# Patient Record
Sex: FEMALE | Race: WHITE | NOT HISPANIC OR LATINO | Employment: FULL TIME | URBAN - METROPOLITAN AREA
[De-identification: names, ages, dates, MRNs, and addresses within clinical notes are randomized per-mention and may not be internally consistent; named-entity substitution may affect disease eponyms.]

---

## 2017-03-27 ENCOUNTER — OFFICE VISIT (OUTPATIENT)
Dept: MEDICAL GROUP | Age: 52
End: 2017-03-27
Payer: COMMERCIAL

## 2017-03-27 VITALS
DIASTOLIC BLOOD PRESSURE: 80 MMHG | WEIGHT: 238.4 LBS | SYSTOLIC BLOOD PRESSURE: 122 MMHG | OXYGEN SATURATION: 96 % | HEIGHT: 68 IN | TEMPERATURE: 97.9 F | BODY MASS INDEX: 36.13 KG/M2 | HEART RATE: 80 BPM

## 2017-03-27 DIAGNOSIS — M72.2 PLANTAR FASCIITIS, BILATERAL: ICD-10-CM

## 2017-03-27 DIAGNOSIS — E78.2 MIXED HYPERLIPIDEMIA: ICD-10-CM

## 2017-03-27 PROCEDURE — 99214 OFFICE O/P EST MOD 30 MIN: CPT | Performed by: FAMILY MEDICINE

## 2017-03-27 RX ORDER — LURASIDONE HYDROCHLORIDE 20 MG/1
20 TABLET, FILM COATED ORAL
COMMUNITY
End: 2017-09-29

## 2017-03-27 NOTE — PATIENT INSTRUCTIONS
Do not go barefoot  Take Advil 200 mg - take 3 pills 3 times per day with food for 3-5 days.  Ice 5 minutes every evening.  Stretch your foot.

## 2017-03-27 NOTE — MR AVS SNAPSHOT
"        Chioma Mock   3/27/2017 8:50 AM   Office Visit   MRN: [id number]    Department:  [address] [place] [address]   Dept Phone:  [phone]    Description:  Female : 1965   Provider:  Verena Kebede M.D.           Reason for Visit     Foot Pain both heels x 4 months      Allergies as of 3/27/2017     Allergen Noted Reactions    Nkda [No Known Drug Allergy] 10/19/2010       Norco [Hydrocodone-Acetaminophen] 2014       Vomiting       You were diagnosed with     Plantar fasciitis, bilateral   [934089]       Mixed hyperlipidemia   [272.2.ICD-9-CM]         Vital Signs     Blood Pressure Pulse Temperature Height Weight Body Mass Index    122/80 mmHg 80 36.6 °C (97.9 °F) 1.727 m (5' 7.99\") 108.138 kg (238 lb 6.4 oz) 36.26 kg/m2    Oxygen Saturation Breastfeeding? Smoking Status             96% No Never Smoker          Basic Information     Date Of Birth Sex Race Ethnicity Preferred Language    1965 Female White Non- English      Your appointments     2017  3:00 PM   Annual Exam with Mey Hubbard M.D.   [facility]    [address]  [place] [address] [phone]            2017  9:50 AM   ANNUAL EXAM PREVENTATIVE with Verena Kebede M.D.   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   2009 - Present    Insomnia G47.00   2009 - Present    Obesity E66.9   2010 - Present    Vitamin D insufficiency E55.9   2011 - Present    Neurodermatitis L28.0   2011 - Present    Perimenopausal N95.1   2013 - Present    History of nephrolithiasis " Z87.442   9/29/2014 - Present      Health Maintenance        Date Due Completion Dates    IMM INFLUENZA (1) 9/1/2016 9/29/2014, 11/11/2013    MAMMOGRAM 5/24/2017 5/24/2016, 2/20/2013, 8/24/2009, 8/24/2009, 8/13/2007, 8/13/2007, 7/15/2005    PAP SMEAR 3/28/2019 3/28/2016, 3/28/2016, 1/28/2013    IMM DTaP/Tdap/Td Vaccine (2 - Td) 6/14/2026 6/14/2016, 5/13/2016            Current Immunizations     Influenza TIV (IM) 11/11/2013    Influenza Vaccine Quad Inj (Pf) 9/29/2014    Tdap Vaccine 6/14/2016, 5/13/2016    Tetanus Vaccine 7/28/2006      Below and/or attached are the medications your provider expects you to take. Review all of your home medications and newly ordered medications with your provider and/or pharmacist. Follow medication instructions as directed by your provider and/or pharmacist. Please keep your medication list with you and share with your provider. Update the information when medications are discontinued, doses are changed, or new medications (including over-the-counter products) are added; and carry medication information at all times in the event of emergency situations     Allergies:  NKDA - (reactions not documented)     NORCO - (reactions not documented)               Medications  Valid as of: March 27, 2017 -  9:22 AM    Generic Name Brand Name Tablet Size Instructions for use    BuPROPion HCl   Take 200 mg by mouth every day.        ClonazePAM (Tab) KLONOPIN 0.5 MG Take 0.5 mg by mouth every bedtime.        Levonorgestrel (IUD) MIRENA 20 MCG/24HR 1 Each by Intrauterine route Once.        Lurasidone HCl (Tab) LATUDA 20 MG Take 20 mg by mouth with dinner.        TraZODone HCl (Tab) DESYREL 100 MG Take 100 mg by mouth every evening.        .                 Medicines prescribed today were sent to:     [facility] #[id number] - [place], [place] - [address] [place] [address] AT [address] [address]    Phone: [phone] Fax: [phone]    Open 24 Hours?: No      Medication refill  instructions:       If your prescription bottle indicates you have medication refills left, it is not necessary to call your provider’s office. Please contact your pharmacy and they will refill your medication.    If your prescription bottle indicates you do not have any refills left, you may request refills at any time through one of the following ways: The online [employer] system (except Urgent Care), by calling your provider’s office, or by asking your pharmacy to contact your provider’s office with a refill request. Medication refills are processed only during regular business hours and may not be available until the next business day. Your provider may request additional information or to have a follow-up visit with you prior to refilling your medication.   *Please Note: Medication refills are assigned a new Rx number when refilled electronically. Your pharmacy may indicate that no refills were authorized even though a new prescription for the same medication is available at the pharmacy. Please request the medicine by name with the pharmacy before contacting your provider for a refill.        Your To Do List     Future Labs/Procedures Complete By Expires    COMP METABOLIC PANEL  As directed 3/28/2018    LIPID PROFILE  As directed 3/27/2018      Instructions    Do not go barefoot  Take Advil 200 mg - take 3 pills 3 times per day with food for 3-5 days.  Ice 5 minutes every evening.  Stretch your foot.         Other Notes About Your Plan                  [employer] Access Code: Activation code not generated  Current [employer] Status: Active

## 2017-03-27 NOTE — PROGRESS NOTES
Subjective:     Chief Complaint   Patient presents with   • Foot Pain     both heels x 4 months     Chioma Mock is a 51 y.o. female here today for issues listed below      1.  about 4 months of pain at the bottom of the heels on both feet. First step after getting out of bed in the morning and after sitting for a while is very painful. Improves after several steps however at the end of the day she'll have aching if she's been walking a lot. Has not tried ice or anti-inflammatories. Symptoms are worse when barefoot in better in shoes. She has been doing some walking. No known injury. No prior history of the same.    2. Mixed hyperlipidemia chronic condition. Last labs November 2016 with elevated total and LDL cholesterol. Since then she has lost some weight. She is not specifically tracking steps or calories     She continues to see psychiatry for depression. Has had some medication adjustments which are working well for her  Allergies: Nkda and Norco  Current medicines (including changes today)  Current Outpatient Prescriptions   Medication Sig Dispense Refill   • lurasidone (LATUDA) 20 MG Tab Take 20 mg by mouth with dinner.     • levonorgestrel (MIRENA, 52 MG,) 20 MCG/24HR IUD 1 Each by Intrauterine route Once.     • BuPROPion HCl (WELLBUTRIN XL PO) Take 200 mg by mouth every day.     • trazodone (DESYREL) 100 MG TABS Take 100 mg by mouth every evening.     • clonazepam (KLONOPIN) 0.5 MG TABS Take 0.5 mg by mouth every bedtime.       No current facility-administered medications for this visit.       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  Health Maintenance: Pap and mammogram are current  ROS  Constitutional: Negative for fever, chills/sweats   Respiratory: Negative for shortness of breath, MCCLOUD  Cardiovascular: Negative for chest pain or pressure  GI/: Negative for diarrhea/constipation / urinary difficulty  All other systems reviewed and are negative except as per HPI.        Objective:     Blood pressure 122/80, pulse 80, temperature 36.6 °C (97.9 °F), height 1.727 m (5' 7.99\"), weight 108.138 kg (238 lb 6.4 oz), SpO2 96 %, not currently breastfeeding. Body mass index is 36.26 kg/(m^2).  Physical Exam:  Wt Readings from Last 4 Encounters:   03/27/17 108.138 kg (238 lb 6.4 oz)   07/28/16 109.317 kg (241 lb)   07/08/16 109.77 kg (242 lb)   05/13/16 113.309 kg (249 lb 12.8 oz)     Alert, oriented in no acute distress.  Eye contact is good, speech goal directed, affect calm  Ext: no edema, no tenderness to palpation over shins. Tenderness to palpation over plantar surfaces of bilateral heels. Very high arch. Good Achilles flexibility and gastroc flexibility. Ambulates without limp      Assessment and Plan:       Treatment Changes: NSAIDs dosing discussed.  Chioma was seen today for foot pain.    Diagnoses and all orders for this visit:    Plantar fasciitis, bilateral  Comments:  Discussed rest, ice, anti-inflammatories, supportive footwear. If not significantly improved in the next month recommend podiatry    Mixed hyperlipidemia  Comments:  Elevated on prior visit. Do labs prior to next office visit.  Orders:  -     LIPID PROFILE; Future  -     COMP METABOLIC PANEL; Future    Obesity, Class II, BMI 35-39.9, with comorbidity (CMS-HCC)  Comments:  Discussed diet, exercise. Encouraged patient to continue " current weight loss.  Orders:  -     Patient identified as having weight management issue.  Appropriate orders and counseling given.        Followup: Return for appt July 2017 already scheduled. sooner should new symptoms or problems arise.

## 2017-06-14 ENCOUNTER — OFFICE VISIT (OUTPATIENT)
Dept: MEDICAL GROUP | Facility: PHYSICIAN GROUP | Age: 52
End: 2017-06-14
Payer: COMMERCIAL

## 2017-06-14 VITALS
TEMPERATURE: 98.1 F | OXYGEN SATURATION: 93 % | HEART RATE: 75 BPM | RESPIRATION RATE: 16 BRPM | SYSTOLIC BLOOD PRESSURE: 102 MMHG | BODY MASS INDEX: 37.44 KG/M2 | WEIGHT: 247 LBS | DIASTOLIC BLOOD PRESSURE: 70 MMHG | HEIGHT: 68 IN

## 2017-06-14 DIAGNOSIS — M72.2 PLANTAR FASCIITIS, BILATERAL: ICD-10-CM

## 2017-06-14 PROBLEM — M79.672 BILATERAL FOOT PAIN: Status: ACTIVE | Noted: 2017-06-14

## 2017-06-14 PROBLEM — M79.671 BILATERAL FOOT PAIN: Status: ACTIVE | Noted: 2017-06-14

## 2017-06-14 PROCEDURE — 99213 OFFICE O/P EST LOW 20 MIN: CPT | Performed by: NURSE PRACTITIONER

## 2017-06-14 RX ORDER — BUPROPION HYDROCHLORIDE 200 MG/1
200 TABLET, EXTENDED RELEASE ORAL 2 TIMES DAILY
COMMUNITY
End: 2018-03-28

## 2017-06-14 RX ORDER — NAPROXEN 500 MG/1
500 TABLET ORAL 2 TIMES DAILY WITH MEALS
Qty: 30 TAB | Refills: 0 | Status: SHIPPED | OUTPATIENT
Start: 2017-06-14 | End: 2018-03-28

## 2017-06-14 RX ORDER — TRAZODONE HYDROCHLORIDE 150 MG/1
150 TABLET ORAL NIGHTLY
COMMUNITY
End: 2020-07-01

## 2017-06-14 NOTE — MR AVS SNAPSHOT
"        Chioma DAX Emili   2017 11:00 AM   Office Visit   MRN: [id number]    Department:  [facility]   Dept Phone:  [phone]    Description:  Female : 1965   Provider:  ASHA Pardo           Reason for Visit     Foot Pain bilateral foot pain      Allergies as of 2017     Allergen Noted Reactions    Nkda [No Known Drug Allergy] 10/19/2010       Norco [Hydrocodone-Acetaminophen] 2014       Vomiting       You were diagnosed with     Bilateral foot pain   [382315]         Vital Signs     Blood Pressure Pulse Temperature Respirations Height Weight    102/70 mmHg 75 36.7 °C (98.1 °F) 16 1.727 m (5' 7.99\") 112.038 kg (247 lb)    Body Mass Index Oxygen Saturation Smoking Status             37.56 kg/m2 93% Never Smoker          Basic Information     Date Of Birth Sex Race Ethnicity Preferred Language    1965 Female White Non- English      Your appointments     2017  3:00 PM   Annual Exam with Lida Smith M.D.   [facility] ([address], [address] [id number] [phone]            Aug 30, 2017  4:20 PM   NEW TO YOU with Kia Sue D.O.   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   2009 - Present    Insomnia G47.00   2009 - Present    Obesity E66.9   2010 - Present    Vitamin D insufficiency E55.9   2011 - Present    Neurodermatitis L28.0   2011 - Present    Perimenopausal N95.1   2013 - Present    History of nephrolithiasis Z87.442   2014 - Present    Bilateral foot pain M79.671, M79.672   2017 - Present      Health " Maintenance        Date Due Completion Dates    MAMMOGRAM 5/24/2017 5/24/2016, 2/20/2013, 8/24/2009, 8/24/2009, 8/13/2007, 8/13/2007, 7/15/2005    PAP SMEAR 3/28/2019 3/28/2016, 3/28/2016, 1/28/2013    IMM DTaP/Tdap/Td Vaccine (2 - Td) 6/14/2026 6/14/2016, 5/13/2016            Current Immunizations     Influenza TIV (IM) 11/11/2013    Influenza Vaccine Quad Inj (Pf) 9/29/2014    Tdap Vaccine 6/14/2016, 5/13/2016    Tetanus Vaccine 7/28/2006      Below and/or attached are the medications your provider expects you to take. Review all of your home medications and newly ordered medications with your provider and/or pharmacist. Follow medication instructions as directed by your provider and/or pharmacist. Please keep your medication list with you and share with your provider. Update the information when medications are discontinued, doses are changed, or new medications (including over-the-counter products) are added; and carry medication information at all times in the event of emergency situations     Allergies:  NKDA - (reactions not documented)     NORCO - (reactions not documented)               Medications  Valid as of: June 14, 2017 - 11:28 AM    Generic Name Brand Name Tablet Size Instructions for use    BuPROPion HCl   Take 200 mg by mouth every day.        BuPROPion HCl (TABLET SR 12 HR) WELLBUTRIN  MG Take 200 mg by mouth 2 times a day.        ClonazePAM (Tab) KLONOPIN 0.5 MG Take 0.5 mg by mouth every bedtime.        LamoTRIgine   Take  by mouth.        Levonorgestrel (IUD) MIRENA 20 MCG/24HR 1 Each by Intrauterine route Once.        Lurasidone HCl (Tab) LATUDA 20 MG Take 20 mg by mouth with dinner.        Naproxen (Tab) NAPROSYN 500 MG Take 1 Tab by mouth 2 times a day, with meals.        TraZODone HCl (Tab) DESYREL 100 MG Take 100 mg by mouth every evening.        TraZODone HCl (Tab) DESYREL 150 MG Take 150 mg by mouth every evening.        .                 Medicines prescribed today were sent to:        [facility] #[id number] - [place], [place] - [address]    [address] [facility] [place] [address]    Phone: [phone] Fax: [phone]    Open 24 Hours?: No      Medication refill instructions:       If your prescription bottle indicates you have medication refills left, it is not necessary to call your provider’s office. Please contact your pharmacy and they will refill your medication.    If your prescription bottle indicates you do not have any refills left, you may request refills at any time through one of the following ways: The online "[employer] system (except Urgent Care), by calling your provider’s office, or by asking your pharmacy to contact your provider’s office with a refill request. Medication refills are processed only during regular business hours and may not be available until the next business day. Your provider may request additional information or to have a follow-up visit with you prior to refilling your medication.   *Please Note: Medication refills are assigned a new Rx number when refilled electronically. Your pharmacy may indicate that no refills were authorized even though a new prescription for the same medication is available at the pharmacy. Please request the medicine by name with the pharmacy before contacting your provider for a refill.        Referral     A referral request has been sent to our patient care coordination department. Please allow 3-5 business days for us to process this request and contact you either by phone or mail. If you do not hear from us by the 5th business day, please call us at [phone].        Other Notes About Your Plan                  [employer] Access Code: Activation code not generated  Current "[employer] Status: Active

## 2017-06-14 NOTE — ASSESSMENT & PLAN NOTE
Established problem, ongoing since Fall 2016. Diagnosed as plantar fasciitis bilaterally. It is getting worse. States she was recommended to stretch feet and roll on ice. Not prescribed any anti-inflammatories. She does have Dr. Singh's insoles currently. Ibuprofen did not help.

## 2017-06-16 NOTE — PROGRESS NOTES
Subjective:     Chief Complaint   Patient presents with   • Foot Pain     bilateral foot pain       HPI  Chioma Mock is a 51 y.o. female here today for complaints of foot pain.    Bilateral foot pain  Established problem, ongoing since Fall 2016. Diagnosed as plantar fasciitis bilaterally. It is getting worse. States she was recommended to stretch feet and roll on ice. Not prescribed any anti-inflammatories. She does have Dr. Singh's insoles currently. Ibuprofen did not help.          The encounter diagnosis was Plantar fasciitis, bilateral.    Allergies: Nkda and Norco  Current medicines (including changes today)  Current Outpatient Prescriptions   Medication Sig Dispense Refill   • LamoTRIgine (LAMICTAL PO) Take  by mouth.     • buPROPion (WELLBUTRIN SR) 200 MG SR tablet Take 200 mg by mouth 2 times a day.     • trazodone (DESYREL) 150 MG Tab Take 150 mg by mouth every evening.     • naproxen (NAPROSYN) 500 MG Tab Take 1 Tab by mouth 2 times a day, with meals. 30 Tab 0   • levonorgestrel (MIRENA, 52 MG,) 20 MCG/24HR IUD 1 Each by Intrauterine route Once.     • clonazepam (KLONOPIN) 0.5 MG TABS Take 0.5 mg by mouth every bedtime.     • lurasidone (LATUDA) 20 MG Tab Take 20 mg by mouth with dinner.     • BuPROPion HCl (WELLBUTRIN XL PO) Take 200 mg by mouth every day.     • trazodone (DESYREL) 100 MG TABS Take 100 mg by mouth every evening.       No current facility-administered medications for this visit.       She  has a past medical history of Depressive disorder, not elsewhere classified (8/22/2009); Preventative health care (8/22/2009); Benign Neoplasm of Muscle left leg. (8/22/2009); Adjustment reaction with anxiety and depression (8/22/2009); Insomnia (8/22/2009); Juvenile myoclonic epilepsy (CMS-HCC); History of epilepsy (10/19/2010); Sleep related leg cramps (11/30/2010); Obesity (11/30/2010); Major depressive disorder, recurrent episode, moderate (CMS-HCC) (6/23/2011); Dermatitis (9/21/2011);  "Neurodermatitis (9/28/2011); Major depressive disorder, recurrent severe without psychotic features (CMS-HCC) (8/22/2009); and History of nephrolithiasis (9/29/2014).      ROS  As stated in HPI      Objective:     Blood pressure 102/70, pulse 75, temperature 36.7 °C (98.1 °F), resp. rate 16, height 1.727 m (5' 7.99\"), weight 112.038 kg (247 lb), SpO2 93 %. Body mass index is 37.56 kg/(m^2).  Physical Exam:  General: Alert, oriented, in no acute distress.  Eye contact is good, speech goal directed, affect calm  Gross hearing intact.  Ext: no edema, no tenderness to palpation over shins or ankle joints. Tenderness to palpation over plantar surfaces of bilateral heels. Very high arch. Good Achilles flexibility flexibility. Ambulates without limp  Gait steady.     Assessment and Plan:   Assessment/Plan:  1. Plantar fascitis, bilateral   established problem not controlled. Start naproxen twice daily with food for 2 weeks. Continue stretching, night splints, ice, and recommended referral to podiatry for further management as this appears to be an ongoing problem.  - naproxen (NAPROSYN) 500 MG Tab; Take 1 Tab by mouth 2 times a day, with meals.  Dispense: 30 Tab; Refill: 0  - REFERRAL TO PODIATRY       Follow up:  Return if symptoms worsen or fail to improve.    Educated in proper administration of medication(s) ordered today including safety, possible SE, risks, benefits, rationale and alternatives to therapy.   Supportive care, differential diagnoses, and indications for immediate follow-up discussed with patient.    Pathogenesis of diagnosis discussed including typical length and natural progression.    Instructed to return to clinic or nearest emergency department for any change in condition, further concerns, or worsening of symptoms.  Patient states understanding of the plan of care and discharge instructions.      Please note that this dictation was created using voice recognition software. I have made every " reasonable attempt to correct obvious errors, but I expect that there are errors of grammar and possibly content that I did not discover before finalizing the note.    Followup: Return if symptoms worsen or fail to improve. sooner should new symptoms or problems arise.

## 2017-07-05 ENCOUNTER — GYNECOLOGY VISIT (OUTPATIENT)
Dept: OBGYN | Facility: CLINIC | Age: 52
End: 2017-07-05
Payer: COMMERCIAL

## 2017-07-05 VITALS
BODY MASS INDEX: 36.98 KG/M2 | HEIGHT: 68 IN | DIASTOLIC BLOOD PRESSURE: 82 MMHG | SYSTOLIC BLOOD PRESSURE: 124 MMHG | WEIGHT: 244 LBS

## 2017-07-05 DIAGNOSIS — Z01.419 WELL WOMAN EXAM: ICD-10-CM

## 2017-07-05 DIAGNOSIS — Z12.31 ENCOUNTER FOR SCREENING MAMMOGRAM FOR BREAST CANCER: ICD-10-CM

## 2017-07-05 PROCEDURE — 99396 PREV VISIT EST AGE 40-64: CPT | Performed by: OBSTETRICS & GYNECOLOGY

## 2017-07-05 NOTE — MR AVS SNAPSHOT
"        Chioma Mock   2017 3:00 PM   Gynecology Visit   MRN: [id number]    Department:  [facility]   Dept Phone:  [phone]    Description:  Female : 1965   Provider:  Lida Smith M.D.           Reason for Visit     Gynecologic Exam           Allergies as of 2017     Allergen Noted Reactions    Nkda [No Known Drug Allergy] 10/19/2010       Norco [Hydrocodone-Acetaminophen] 2014       Vomiting       You were diagnosed with     Well woman exam   [421105]       Encounter for screening mammogram for breast cancer   [[id number]]         Vital Signs     Blood Pressure Height Weight Body Mass Index Smoking Status       124/82 mmHg 1.727 m (5' 8\") 110.678 kg (244 lb) 37.11 kg/m2 Never Smoker        Basic Information     Date Of Birth Sex Race Ethnicity Preferred Language    1965 Female White Non- English      Your appointments     Aug 30, 2017  4:20 PM   NEW TO YOU with Kia Sue D.O.   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   2009 - Present    Insomnia G47.00   2009 - Present    Obesity E66.9   2010 - Present    Vitamin D insufficiency E55.9   2011 - Present    Neurodermatitis L28.0   2011 - Present    Perimenopausal N95.1   2013 - Present    History of nephrolithiasis Z87.442   2014 - Present    Bilateral foot pain M79.671, M79.672   2017 - Present      Health Maintenance        Date Due Completion Dates    MAMMOGRAM 2017, 2013, 2009, 2009, 2007, 2007, 7/15/2005    IMM INFLUENZA (1) 2017, 2013    PAP SMEAR 3/28/2019 " 3/28/2016, 3/28/2016, 1/28/2013    IMM DTaP/Tdap/Td Vaccine (2 - Td) 6/14/2026 6/14/2016, 5/13/2016            Current Immunizations     Influenza TIV (IM) 11/11/2013    Influenza Vaccine Quad Inj (Pf) 9/29/2014    Tdap Vaccine 6/14/2016, 5/13/2016    Tetanus Vaccine 7/28/2006      Below and/or attached are the medications your provider expects you to take. Review all of your home medications and newly ordered medications with your provider and/or pharmacist. Follow medication instructions as directed by your provider and/or pharmacist. Please keep your medication list with you and share with your provider. Update the information when medications are discontinued, doses are changed, or new medications (including over-the-counter products) are added; and carry medication information at all times in the event of emergency situations     Allergies:  NKDA - (reactions not documented)     NORCO - (reactions not documented)               Medications  Valid as of: July 05, 2017 -  3:28 PM    Generic Name Brand Name Tablet Size Instructions for use    BuPROPion HCl   Take 200 mg by mouth every day.        BuPROPion HCl (TABLET SR 12 HR) WELLBUTRIN  MG Take 200 mg by mouth 2 times a day.        ClonazePAM (Tab) KLONOPIN 0.5 MG Take 0.5 mg by mouth every bedtime.        LamoTRIgine   Take  by mouth.        Levonorgestrel (IUD) MIRENA 20 MCG/24HR 1 Each by Intrauterine route Once.        Lurasidone HCl (Tab) LATUDA 20 MG Take 20 mg by mouth with dinner.        Naproxen (Tab) NAPROSYN 500 MG Take 1 Tab by mouth 2 times a day, with meals.        TraZODone HCl (Tab) DESYREL 100 MG Take 100 mg by mouth every evening.        TraZODone HCl (Tab) DESYREL 150 MG Take 150 mg by mouth every evening.        .                 Medicines prescribed today were sent to:     [facility] #[id number] - [place], [address] AT [address] [address]    Phone: [phone] Fax: [phone]    Open 24 Hours?: No        Medication refill instructions:       If your prescription bottle indicates you have medication refills left, it is not necessary to call your provider’s office. Please contact your pharmacy and they will refill your medication.    If your prescription bottle indicates you do not have any refills left, you may request refills at any time through one of the following ways: The online [employer] system (except Urgent Care), by calling your provider’s office, or by asking your pharmacy to contact your provider’s office with a refill request. Medication refills are processed only during regular business hours and may not be available until the next business day. Your provider may request additional information or to have a follow-up visit with you prior to refilling your medication.   *Please Note: Medication refills are assigned a new Rx number when refilled electronically. Your pharmacy may indicate that no refills were authorized even though a new prescription for the same medication is available at the pharmacy. Please request the medicine by name with the pharmacy before contacting your provider for a refill.        Your To Do List     Future Labs/Procedures Complete By Expires    MA-SCREEN MAMMO W/CAD-BILAT  As directed 7/5/2018      Other Notes About Your Plan                  [employer] Access Code: Activation code not generated  Current [employer] Status: Active

## 2017-07-05 NOTE — PROGRESS NOTES
"Subjective:      Chioma Mock is a 51 y.o. female who presents with Gynecologic Exam        CC: annual exam    Gynecologic Exam    50 yo  using Mirena IUD for contraception presents for annual exam.  No complaints.  No menopausal symtpoms.  Not having periods with Mirena. No hx of cervical dysplasia or abnormal pap smears.     ROS.REVIEW OF SYSTEMS:    Pertinent positives and negatives mentioned in HPI.    All other systems reviewed and are negative.       Objective:     /82 mmHg  Ht 1.727 m (5' 8\")  Wt 110.678 kg (244 lb)  BMI 37.11 kg/m2     Physical Exam      GENERAL: Alert, in no apparent distress  PSYCHIATRIC: Appropriate affect, intact insight and judgement.  NECK:  Nontender, no masses.  No Thyromegaly or nodules. No lymphadenopathy.  RESPIRATORY: Normal respiratory effort.  Lungs clear to auscultation.   CARDIOVASCULAR: RRR, no murmur, gallop, or rub.  ABDOMEN: Soft, obese, nontender, nondistended.  No palpable masses.  No rebound or guarding.  No inguinal lymphadenopathy.  No hepatosplenomegaly.  No hernias.  BACK: No CVA tenderness  EXTREMITIES: No edema  SKIN: No rash    BREAST: No masses or tenderness.  No skin changes.  No nipple inversion or discharge. No axillary lymphadenopathy.      GENITOURINARY:  Normal external genitalia, no lesions.  Normal urethral meatus, no masses or tenderness.  Normal bladder without fullness or masses.  Vagina well estrogenized, no vaginal discharge or lesions.  Cervix without lesions or discharge, nontender.  IUD strings not visible at cervical os.   Uterus normal size, shape, and contour, nontender.  Adnexa nontender, no masses.  Normal anus and perineum.    Rectal Exam - not indicated.       Assessment/Plan:     1. Well woman exam  Reviewed monthly self breast exams and calcium intake.  Pap current.  Mirena IUD in place for 4 years - strings not visible, but position has been checked by Dr. Hubbard and in proper position (after strings not " visible).    2. Encounter for screening mammogram for breast cancer  - MA-SCREEN MAMMO W/CAD-BILAT; Future    F/U prn.

## 2017-07-19 ENCOUNTER — HOSPITAL ENCOUNTER (OUTPATIENT)
Dept: RADIOLOGY | Facility: MEDICAL CENTER | Age: 52
End: 2017-07-19
Attending: OBSTETRICS & GYNECOLOGY
Payer: COMMERCIAL

## 2017-07-19 DIAGNOSIS — Z12.31 ENCOUNTER FOR SCREENING MAMMOGRAM FOR BREAST CANCER: ICD-10-CM

## 2017-07-19 PROCEDURE — 77063 BREAST TOMOSYNTHESIS BI: CPT

## 2017-09-29 ENCOUNTER — OFFICE VISIT (OUTPATIENT)
Dept: MEDICAL GROUP | Facility: PHYSICIAN GROUP | Age: 52
End: 2017-09-29
Payer: COMMERCIAL

## 2017-09-29 VITALS
HEIGHT: 68 IN | BODY MASS INDEX: 37.89 KG/M2 | RESPIRATION RATE: 18 BRPM | HEART RATE: 77 BPM | TEMPERATURE: 98.7 F | SYSTOLIC BLOOD PRESSURE: 110 MMHG | DIASTOLIC BLOOD PRESSURE: 80 MMHG | WEIGHT: 250 LBS | OXYGEN SATURATION: 96 %

## 2017-09-29 DIAGNOSIS — E55.9 VITAMIN D INSUFFICIENCY: ICD-10-CM

## 2017-09-29 DIAGNOSIS — Z23 NEED FOR INFLUENZA VACCINATION: ICD-10-CM

## 2017-09-29 DIAGNOSIS — E66.9 OBESITY (BMI 30-39.9): ICD-10-CM

## 2017-09-29 DIAGNOSIS — L03.116 CELLULITIS OF LEFT LOWER EXTREMITY: ICD-10-CM

## 2017-09-29 PROCEDURE — 90686 IIV4 VACC NO PRSV 0.5 ML IM: CPT | Performed by: FAMILY MEDICINE

## 2017-09-29 PROCEDURE — 99214 OFFICE O/P EST MOD 30 MIN: CPT | Mod: 25 | Performed by: FAMILY MEDICINE

## 2017-09-29 PROCEDURE — 90471 IMMUNIZATION ADMIN: CPT | Performed by: FAMILY MEDICINE

## 2017-09-29 RX ORDER — TRIAMCINOLONE ACETONIDE 1 MG/G
CREAM TOPICAL
Qty: 1 TUBE | Refills: 1 | Status: SHIPPED | OUTPATIENT
Start: 2017-09-29 | End: 2018-03-28

## 2017-09-29 RX ORDER — CEPHALEXIN 500 MG/1
500 CAPSULE ORAL 2 TIMES DAILY
Qty: 14 CAP | Refills: 0 | Status: SHIPPED | OUTPATIENT
Start: 2017-09-29 | End: 2017-10-06

## 2017-09-29 RX ORDER — TRIAMCINOLONE ACETONIDE 1 MG/G
CREAM TOPICAL
Qty: 1 TUBE | Refills: 1 | Status: SHIPPED | OUTPATIENT
Start: 2017-09-29 | End: 2017-09-29 | Stop reason: SDUPTHER

## 2017-09-29 RX ORDER — CEPHALEXIN 500 MG/1
500 CAPSULE ORAL 2 TIMES DAILY
Qty: 14 CAP | Refills: 0 | Status: SHIPPED | OUTPATIENT
Start: 2017-09-29 | End: 2017-09-29 | Stop reason: SDUPTHER

## 2017-09-29 ASSESSMENT — PATIENT HEALTH QUESTIONNAIRE - PHQ9
SUM OF ALL RESPONSES TO PHQ QUESTIONS 1-9: 5
CLINICAL INTERPRETATION OF PHQ2 SCORE: 2
5. POOR APPETITE OR OVEREATING: 0 - NOT AT ALL

## 2017-09-29 NOTE — ASSESSMENT & PLAN NOTE
Ongoing issue. Patient reports that she has not been following a specific healthy eating plan on getting regular daily exercise. Unfortunately review of her current medications also reveal that her medications could be contributing to her weight gain also.

## 2017-09-29 NOTE — PROGRESS NOTES
Subjective:   Chioma Mock is a 51 y.o. female here today for Lab review, skin irritation, obesity, lab review    Vitamin D insufficiency  Ongoing issue. Patient reports she currently takes vitamin D 2000 international units once a day. Chart review shows less vitamin D level was at 31 which is below recommend a range of 40.    Obesity (BMI 30-39.9)  Ongoing issue. Patient reports that she has not been following a specific healthy eating plan on getting regular daily exercise. Unfortunately review of her current medications also reveal that her medications could be contributing to her weight gain also.    Cellulitis of left lower extremity  New problem. Patient reports that she has had redness and itching over the anterior portion of the lower left leg. She states the present for approximately 2 weeks. It is Progressively worse; as the itching is worse she has actually opened up the skin. She has multiple lesions that have opened up and drained/bled. She had been given a prescription for an anti-itch cream that she mix it with over-the-counter Neosporin and reports that this has not been beneficial in relieving her problem. She denies any specific pain, fever, chills.         Current medicines (including changes today)  Current Outpatient Prescriptions   Medication Sig Dispense Refill   • triamcinolone acetonide (KENALOG) 0.1 % Cream Apply to affected area twice a day for 2 weeks 1 Tube 1   • cephALEXin (KEFLEX) 500 MG Cap Take 1 Cap by mouth 2 times a day for 7 days. 14 Cap 0   • LamoTRIgine (LAMICTAL PO) Take  by mouth.     • buPROPion (WELLBUTRIN SR) 200 MG SR tablet Take 200 mg by mouth 2 times a day.     • trazodone (DESYREL) 150 MG Tab Take 150 mg by mouth every evening.     • levonorgestrel (MIRENA, 52 MG,) 20 MCG/24HR IUD 1 Each by Intrauterine route Once.     • BuPROPion HCl (WELLBUTRIN XL PO) Take 200 mg by mouth every day.     • clonazepam (KLONOPIN) 0.5 MG TABS Take 0.5 mg by mouth every bedtime.    "  • naproxen (NAPROSYN) 500 MG Tab Take 1 Tab by mouth 2 times a day, with meals. 30 Tab 0     No current facility-administered medications for this visit.      She  has a past medical history of Adjustment reaction with anxiety and depression (8/22/2009); Benign Neoplasm of Muscle left leg. (8/22/2009); Depressive disorder, not elsewhere classified (8/22/2009); Dermatitis (9/21/2011); History of epilepsy (10/19/2010); History of nephrolithiasis (9/29/2014); Insomnia (8/22/2009); Juvenile myoclonic epilepsy (CMS-HCC); Major depressive disorder, recurrent episode, moderate (CMS-HCC) (6/23/2011); Major depressive disorder, recurrent severe without psychotic features (CMS-HCC) (8/22/2009); Neurodermatitis (9/28/2011); Obesity (11/30/2010); Preventative health care (8/22/2009); and Sleep related leg cramps (11/30/2010).    ROS   No chest pain, no shortness of breath, no abdominal pain  +Skin irritation on left lower leg     Objective:     Pulse 77, temperature 37.1 °C (98.7 °F), resp. rate 18, height 1.727 m (5' 8\"), weight 113.4 kg (250 lb), SpO2 96 %. Body mass index is 38.01 kg/m².   Physical Exam:  Alert, oriented in no acute distress.  Eye contact is good, speech goal directed, affect calm  HEENT: conjunctiva non-injected, sclera non-icteric.  Pinna normal. Oral mucous membranes pink and moist with no lesions.  Neck No adenopathy or masses in the neck or supraclavicular regions.  Lungs: clear to auscultation bilaterally with good excursion.  CV: regular rate and rhythm.  Abdomen: soft, nontender, No CVAT; Obese  Ext: no edema, color normal, vascularity normal, temperature normal  Skin: Anterior shin of left lower leg-area of erythema extending approximately 15 cm in length and 6 cm in width. Warm to touch; open sores appreciated; no tenderness on palpation; no fluctuance noted      Assessment and Plan:   The following treatment plan was discussed     1. Obesity (BMI 30-39.9)  COMP METABOLIC PANEL    LIPID PROFILE "    VITAMIN D,25 HYDROXY    Uncontrolled. Encourage patient to use healthy eating and daily exercise to help with weight management. Weight loss may be difficult secondary to Psychiatric medications   2. Cellulitis of left lower extremity      Uncontrolled. Prescription for Keflex 500 mg one tab by mouth twice a day ×7 days; triamcinolone cream prescribed to help with itching   3. Vitamin D insufficiency      Uncontrolled; continues current supplementation; recheck labs and monitor for results   4. Need for influenza vaccination  Flu Quad Inj >3 Year Pre-Filled PF    Age appropriate immunization present; patient tolerated procedure well.       Followup: Return in about 6 months (around 3/29/2018) for medication review, Short.

## 2017-09-29 NOTE — ASSESSMENT & PLAN NOTE
New problem. Patient reports that she has had redness and itching over the anterior portion of the lower left leg. She states the present for approximately 2 weeks. It is Progressively worse; as the itching is worse she has actually opened up the skin. She has multiple lesions that have opened up and drained/bled. She had been given a prescription for an anti-itch cream that she mix it with over-the-counter Neosporin and reports that this has not been beneficial in relieving her problem. She denies any specific pain, fever, chills.

## 2017-09-29 NOTE — ASSESSMENT & PLAN NOTE
Ongoing issue. Patient reports she currently takes vitamin D 2000 international units once a day. Chart review shows less vitamin D level was at 31 which is below recommend a range of 40.

## 2018-02-10 ENCOUNTER — HOSPITAL ENCOUNTER (OUTPATIENT)
Dept: LAB | Facility: MEDICAL CENTER | Age: 53
End: 2018-02-10
Attending: FAMILY MEDICINE
Payer: COMMERCIAL

## 2018-02-10 DIAGNOSIS — E66.9 OBESITY (BMI 30-39.9): ICD-10-CM

## 2018-02-10 LAB
25(OH)D3 SERPL-MCNC: 27 NG/ML (ref 30–100)
ALBUMIN SERPL BCP-MCNC: 4.1 G/DL (ref 3.2–4.9)
ALBUMIN/GLOB SERPL: 1.3 G/DL
ALP SERPL-CCNC: 86 U/L (ref 30–99)
ALT SERPL-CCNC: 17 U/L (ref 2–50)
ANION GAP SERPL CALC-SCNC: 8 MMOL/L (ref 0–11.9)
AST SERPL-CCNC: 18 U/L (ref 12–45)
BILIRUB SERPL-MCNC: 0.5 MG/DL (ref 0.1–1.5)
BUN SERPL-MCNC: 16 MG/DL (ref 8–22)
CALCIUM SERPL-MCNC: 9.4 MG/DL (ref 8.5–10.5)
CHLORIDE SERPL-SCNC: 103 MMOL/L (ref 96–112)
CHOLEST SERPL-MCNC: 230 MG/DL (ref 100–199)
CO2 SERPL-SCNC: 27 MMOL/L (ref 20–33)
CREAT SERPL-MCNC: 0.72 MG/DL (ref 0.5–1.4)
GLOBULIN SER CALC-MCNC: 3.2 G/DL (ref 1.9–3.5)
GLUCOSE SERPL-MCNC: 85 MG/DL (ref 65–99)
HDLC SERPL-MCNC: 66 MG/DL
LDLC SERPL CALC-MCNC: 154 MG/DL
POTASSIUM SERPL-SCNC: 3.7 MMOL/L (ref 3.6–5.5)
PROT SERPL-MCNC: 7.3 G/DL (ref 6–8.2)
SODIUM SERPL-SCNC: 138 MMOL/L (ref 135–145)
TRIGL SERPL-MCNC: 51 MG/DL (ref 0–149)

## 2018-02-10 PROCEDURE — 82306 VITAMIN D 25 HYDROXY: CPT

## 2018-02-10 PROCEDURE — 36415 COLL VENOUS BLD VENIPUNCTURE: CPT

## 2018-02-10 PROCEDURE — 80061 LIPID PANEL: CPT

## 2018-02-10 PROCEDURE — 80053 COMPREHEN METABOLIC PANEL: CPT

## 2018-03-28 ENCOUNTER — OFFICE VISIT (OUTPATIENT)
Dept: MEDICAL GROUP | Facility: PHYSICIAN GROUP | Age: 53
End: 2018-03-28
Payer: COMMERCIAL

## 2018-03-28 VITALS
SYSTOLIC BLOOD PRESSURE: 118 MMHG | DIASTOLIC BLOOD PRESSURE: 72 MMHG | BODY MASS INDEX: 36.83 KG/M2 | OXYGEN SATURATION: 93 % | TEMPERATURE: 98.6 F | HEIGHT: 68 IN | WEIGHT: 243 LBS | HEART RATE: 88 BPM

## 2018-03-28 DIAGNOSIS — E55.9 VITAMIN D INSUFFICIENCY: ICD-10-CM

## 2018-03-28 DIAGNOSIS — E78.2 MIXED HYPERLIPIDEMIA: ICD-10-CM

## 2018-03-28 DIAGNOSIS — E66.9 OBESITY (BMI 30-39.9): ICD-10-CM

## 2018-03-28 PROCEDURE — 99214 OFFICE O/P EST MOD 30 MIN: CPT | Performed by: FAMILY MEDICINE

## 2018-03-28 RX ORDER — MODAFINIL 200 MG/1
200 TABLET ORAL DAILY
COMMUNITY

## 2018-03-28 RX ORDER — BUPROPION HYDROCHLORIDE 200 MG/1
200 TABLET, EXTENDED RELEASE ORAL 2 TIMES DAILY
COMMUNITY
End: 2021-08-30

## 2018-03-28 NOTE — ASSESSMENT & PLAN NOTE
Ongoing issue; patient is continuing to make lifestyle changes. Since her last appointment she has lost 7 pounds

## 2018-03-28 NOTE — ASSESSMENT & PLAN NOTE
Ongoing issues; recent labs and intervene detail with the patient today shows her total cholesterol is at 230 and her LDL cholesterol at 154. We have reviewed healthy eating along with daily exercise to help improve this. Patient would like to do lifestyle changes prior to starting any medication

## 2018-03-28 NOTE — ASSESSMENT & PLAN NOTE
Ongoing issues; patient previously had been taking 2000 international units once a day; based on discussion and review I have asked her to increase it to 2000 twice a day

## 2018-08-30 ENCOUNTER — HOSPITAL ENCOUNTER (OUTPATIENT)
Facility: MEDICAL CENTER | Age: 53
End: 2018-08-30
Attending: OBSTETRICS & GYNECOLOGY
Payer: COMMERCIAL

## 2018-08-30 ENCOUNTER — GYNECOLOGY VISIT (OUTPATIENT)
Dept: OBGYN | Facility: MEDICAL CENTER | Age: 53
End: 2018-08-30
Payer: COMMERCIAL

## 2018-08-30 VITALS
BODY MASS INDEX: 38.19 KG/M2 | WEIGHT: 252 LBS | HEIGHT: 68 IN | SYSTOLIC BLOOD PRESSURE: 115 MMHG | DIASTOLIC BLOOD PRESSURE: 79 MMHG

## 2018-08-30 DIAGNOSIS — Z12.4 PAP SMEAR FOR CERVICAL CANCER SCREENING: ICD-10-CM

## 2018-08-30 DIAGNOSIS — Z30.014 ENCOUNTER FOR INITIAL PRESCRIPTION OF INTRAUTERINE CONTRACEPTIVE DEVICE (IUD): ICD-10-CM

## 2018-08-30 DIAGNOSIS — Z11.51 SCREENING FOR HUMAN PAPILLOMAVIRUS (HPV): ICD-10-CM

## 2018-08-30 DIAGNOSIS — Z01.419 WELL WOMAN EXAM WITH ROUTINE GYNECOLOGICAL EXAM: ICD-10-CM

## 2018-08-30 DIAGNOSIS — Z12.39 SCREENING FOR BREAST CANCER: ICD-10-CM

## 2018-08-30 PROCEDURE — 88175 CYTOPATH C/V AUTO FLUID REDO: CPT

## 2018-08-30 PROCEDURE — 87624 HPV HI-RISK TYP POOLED RSLT: CPT

## 2018-08-30 PROCEDURE — 99396 PREV VISIT EST AGE 40-64: CPT | Performed by: OBSTETRICS & GYNECOLOGY

## 2018-08-31 LAB
CYTOLOGY REG CYTOL: NORMAL
HPV HR 12 DNA CVX QL NAA+PROBE: NEGATIVE
HPV16 DNA SPEC QL NAA+PROBE: NEGATIVE
HPV18 DNA SPEC QL NAA+PROBE: NEGATIVE
SPECIMEN SOURCE: NORMAL

## 2018-09-28 ENCOUNTER — APPOINTMENT (OUTPATIENT)
Dept: MEDICAL GROUP | Facility: PHYSICIAN GROUP | Age: 53
End: 2018-09-28
Payer: COMMERCIAL

## 2018-10-12 ENCOUNTER — GYNECOLOGY VISIT (OUTPATIENT)
Dept: OBGYN | Facility: MEDICAL CENTER | Age: 53
End: 2018-10-12
Payer: COMMERCIAL

## 2018-10-12 VITALS
WEIGHT: 255 LBS | BODY MASS INDEX: 38.65 KG/M2 | HEIGHT: 68 IN | DIASTOLIC BLOOD PRESSURE: 68 MMHG | SYSTOLIC BLOOD PRESSURE: 120 MMHG

## 2018-10-12 DIAGNOSIS — T83.32XD INTRAUTERINE CONTRACEPTIVE DEVICE THREADS LOST, SUBSEQUENT ENCOUNTER: ICD-10-CM

## 2018-10-12 DIAGNOSIS — Z30.430 ENCOUNTER FOR IUD INSERTION: ICD-10-CM

## 2018-10-12 DIAGNOSIS — Z30.432 ENCOUNTER FOR IUD REMOVAL: ICD-10-CM

## 2018-10-12 LAB
INT CON NEG: NEGATIVE
INT CON POS: POSITIVE
POC URINE PREGNANCY TEST: NEGATIVE

## 2018-10-12 PROCEDURE — 81025 URINE PREGNANCY TEST: CPT | Performed by: OBSTETRICS & GYNECOLOGY

## 2018-10-12 PROCEDURE — 76857 US EXAM PELVIC LIMITED: CPT | Performed by: OBSTETRICS & GYNECOLOGY

## 2018-10-12 PROCEDURE — 99213 OFFICE O/P EST LOW 20 MIN: CPT | Mod: 25 | Performed by: OBSTETRICS & GYNECOLOGY

## 2018-10-12 NOTE — PROGRESS NOTES
Pt presents for GYN procedures.IUd removal/replacement insert.No bleeding,strings have never been visualized since insertion.   #[phone]  [facility]

## 2018-10-12 NOTE — PROGRESS NOTES
Chioma presents to [facility] for Mirena IUD removal and replacement.  The strings have not been visible since insertion.  She has no menses.  The IUD has been in place for 5 years.  She is very worried about getting pregnant, and would like the  IUD removed and replaced.      Today the patient is counseled on the risks of IUD insertion. Specifically discussed were alternative forms of birth control. I also discussed with the patient the risk of infection on insertion, and had asked the patient to remain on pelvic rest for one week following the insertion. We also discussed the risk of IUD expulsion, the risk of uterine perforation and IUD migration. If the IUD does migrate the patient may require further testing and a separate procedure such as a laparoscopy to retrieve the migrated IUD. I also discussed the 1% risk of pregnancy with IUD use. I also discussed the side effects of possible amenorrhea or irregular bleeding with the Mirena IUD, or heavy, painful menses with the copper IUD.The patient was given the opportunity to ask questions regarding insertion, risks and benefits, all questions are answered in their entirety.  Informed consent is signed.    Procedure note  Urine pregnancy test is negative, informed consent was previously signed.  The bimanual exam is performed the uterus is noted to be 8 weeks in size and is mid position.  A speculum was inserted into the vagina, the cervix was cleansed with Betadine swabs x3.  A tenaculum was placed on the anterior lip of the cervix  No strings were visible.  The endocervical canal was probed with a cytobrush, IUD hook, and ysabel clamp under US guidance.  Unable to removed IUD with multiple attempts.     Limited transvaginal US performed and interpreted by me.    Uterus measures 8.80 cm x 4.26 cm x 4.14 cm with IUD noted in endometrial canal in proper position.  Bilateral ovaries are unremarkable.  No fluid in cul de sac    A/P:  Mirena IUD, IUD strings not  visible at cervical os.  Unable to remove in office under US guidance.  Discussed options of leaving Mirena IUD in for 7 years given age and perimenopausal status, vs. Hysteroscopic removal and replacement in OR.  She is very concerned about getting pregnant, and would like the Mirena removed and replaced.  Counseled the rates of conception are very low at her age, but not zero.  Will schedule for hysteroscopic removal of IUD and replacement.     F/U for preop    Greater than 50%  of this 20 minute visit was used for face to face counseling and coordination of care for this patient and her medical conditions

## 2018-11-29 ENCOUNTER — APPOINTMENT (OUTPATIENT)
Dept: MEDICAL GROUP | Facility: PHYSICIAN GROUP | Age: 53
End: 2018-11-29
Payer: COMMERCIAL

## 2019-02-26 ENCOUNTER — GYNECOLOGY VISIT (OUTPATIENT)
Dept: OBGYN | Facility: MEDICAL CENTER | Age: 54
End: 2019-02-26
Payer: COMMERCIAL

## 2019-02-26 VITALS
WEIGHT: 249 LBS | HEIGHT: 68 IN | BODY MASS INDEX: 37.74 KG/M2 | DIASTOLIC BLOOD PRESSURE: 78 MMHG | SYSTOLIC BLOOD PRESSURE: 130 MMHG

## 2019-02-26 DIAGNOSIS — T83.32XD INTRAUTERINE CONTRACEPTIVE DEVICE THREADS LOST, SUBSEQUENT ENCOUNTER: ICD-10-CM

## 2019-02-26 DIAGNOSIS — Z30.433 ENCOUNTER FOR REMOVAL AND REINSERTION OF INTRAUTERINE CONTRACEPTIVE DEVICE (IUD): ICD-10-CM

## 2019-02-26 PROCEDURE — 99214 OFFICE O/P EST MOD 30 MIN: CPT | Performed by: OBSTETRICS & GYNECOLOGY

## 2019-02-27 NOTE — PROGRESS NOTES
History and Physical Exam    Chief Complaint   Patient presents with   • Pre-op Exam     hystercoscopy IUD rem        History of present illness: 53 y.o.  lmp several years ago using Mirena IUD for contraception presents for preop exam to remove and replace Mirena IUD.  She has had a Mirena IUD in place for 6 years and has been amenorrheic for several years.  She is very worried about getting pregnant, and would like the Mirena removed and replaced.  An attempt was made at removal in the office on 10/12/18 under US guidance, but was unsuccessful.  IUD strings are not visible at the OS, but the IUD is visualized in the uterine cavity on US. Denies hot flashes, vaginal bleeding, mood swings. Denies fevers, chills, URI symptoms, chest pain, SOB, changes in bowel or bladder habits, or vaginal discharge.     She was was scheduled for hysteroscopic removal and replacement of Mirena on 3/16/19 but tells me today she has an important class in the afternoon and is requesting rescheduling.         ROS: Pertinent positives documented in HPI and all other systems reviewed & are negative      All PMH, PSH, allergies, social history and FH reviewed and updated today:  Past Medical History:   Diagnosis Date   • Adjustment reaction with anxiety and depression 2009   • Benign Neoplasm of Muscle left leg. 2009   • Depressive disorder, not elsewhere classified 2009   • Dermatitis 2011   • History of epilepsy 10/19/2010   • History of nephrolithiasis 2014   • Insomnia 2009   • Juvenile myoclonic epilepsy (HCC)     Medicine until age 14   • Major depressive disorder, recurrent episode, moderate (HCC) 2011   • Major depressive disorder, recurrent severe without psychotic features (HCC) 2009   • Neurodermatitis 2011   • Obesity 2010   • Preventative health care 2009   • Sleep related leg cramps 2010       Past Surgical History:   Procedure Laterality  "Date   • TUMOR EXCISION WITH BIOPSY  2006    benign tumor left lower     .  Current Outpatient Prescriptions on File Prior to Visit   Medication Sig Dispense Refill   • buPROPion (WELLBUTRIN SR) 200 MG SR tablet Take 200 mg by mouth 2 times a day.     • modafinil (PROVIGIL) 200 MG Tab Take 200 mg by mouth every day.     • LamoTRIgine (LAMICTAL PO) Take  by mouth.     • trazodone (DESYREL) 150 MG Tab Take 150 mg by mouth every evening.     • clonazepam (KLONOPIN) 0.5 MG TABS Take 0.5 mg by mouth every bedtime.     • levonorgestrel (MIRENA, 52 MG,) 20 MCG/24HR IUD 1 Each by Intrauterine route Once.       No current facility-administered medications on file prior to visit.          Allergies:   Allergies   Allergen Reactions   • Norco [Hydrocodone-Acetaminophen]      Vomiting        Social History     Social History   • Marital status: Legally      Spouse name: N/A   • Number of children: N/A   • Years of education: N/A     Occupational History   • teacher      Social History Main Topics   • Smoking status: Never Smoker   • Smokeless tobacco: Never Used   • Alcohol use 0.0 oz/week      Comment: rarely   • Drug use: No   • Sexual activity: Yes     Partners: Male      Comment: work: teacher     Other Topics Concern   • Not on file     Social History Narrative    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        Family History   Problem Relation Age of Onset   • Diabetes Father    • Hypertension Father    • Cancer Maternal Aunt         breast       Physical exam:  Blood pressure 130/78, height 1.727 m (5' 8\"), weight 112.9 kg (249 lb), not currently breastfeeding.    GENERAL APPEARANCE: no distress  NECK nontender, no masses, thyromegaly or nodules  HEART RRR with normal S1 and S2 ,no murmurs, no gallops, no peripheral edema  LUNG clear to auscultation, normal respiratory effort  ABDOMEN Abdomen soft, non-tender. BS normal. No masses,  No organomegaly  EXTREMITIES:No edema    NEURO " Awake, alert and oriented x 3, Normal gait, no sensory deficits  SKIN No rashes, or ulcers or lesions seen  PSYCHIATRIC: Patient shows appropriate affect, is alert and oriented x3, intact judgment and insight.      ASSESSMENT/PLAN:    Chioma Mock is a 53 y.o.  female with Mirena IUD in place x 6 years with missing strings w who presents for surgical consultation for hysteroscopic removal and replacement of Mirena IUD.    Discusses risks of surgery to include bleeding, infection, transfusion, uterine perforation, damage to surrounding organs, need for repair or laparoscopy, unexpected pathology, unexpected complications, anesthesia risks.      We also discussed and I recommended that she leave the IUD in place for 7 years.  She is very concerned about getting pregnant, and desires removal and replacement. Advised the risk of pregnancy is very minimal at her age.      After discussion of risks and benefits, all questions regarding procedure were answered for patient.    Needs to be NPO after midnight day of surgery.      Follow up in 2 weeks after surgery for postop check.     Spent 30 mins with the patient with over 50% of the time discussing the procedure, risk and benefits and obtaining consent.

## 2019-03-19 ENCOUNTER — APPOINTMENT (OUTPATIENT)
Dept: OBGYN | Facility: MEDICAL CENTER | Age: 54
End: 2019-03-19
Payer: COMMERCIAL

## 2019-09-19 ENCOUNTER — HOSPITAL ENCOUNTER (OUTPATIENT)
Dept: LAB | Facility: MEDICAL CENTER | Age: 54
End: 2019-09-19
Attending: PSYCHIATRY & NEUROLOGY
Payer: COMMERCIAL

## 2019-09-19 LAB
ALBUMIN SERPL BCP-MCNC: 4.4 G/DL (ref 3.2–4.9)
ALBUMIN/GLOB SERPL: 1.6 G/DL
ALP SERPL-CCNC: 92 U/L (ref 30–99)
ALT SERPL-CCNC: 23 U/L (ref 2–50)
ANION GAP SERPL CALC-SCNC: 7 MMOL/L (ref 0–11.9)
AST SERPL-CCNC: 22 U/L (ref 12–45)
BASOPHILS # BLD AUTO: 1.6 % (ref 0–1.8)
BASOPHILS # BLD: 0.07 K/UL (ref 0–0.12)
BILIRUB SERPL-MCNC: 0.5 MG/DL (ref 0.1–1.5)
BUN SERPL-MCNC: 20 MG/DL (ref 8–22)
CALCIUM SERPL-MCNC: 9.5 MG/DL (ref 8.5–10.5)
CHLORIDE SERPL-SCNC: 104 MMOL/L (ref 96–112)
CHOLEST SERPL-MCNC: 226 MG/DL (ref 100–199)
CO2 SERPL-SCNC: 30 MMOL/L (ref 20–33)
CREAT SERPL-MCNC: 0.77 MG/DL (ref 0.5–1.4)
EOSINOPHIL # BLD AUTO: 0.07 K/UL (ref 0–0.51)
EOSINOPHIL NFR BLD: 1.6 % (ref 0–6.9)
ERYTHROCYTE [DISTWIDTH] IN BLOOD BY AUTOMATED COUNT: 41.3 FL (ref 35.9–50)
EST. AVERAGE GLUCOSE BLD GHB EST-MCNC: 105 MG/DL
FASTING STATUS PATIENT QL REPORTED: NORMAL
GLOBULIN SER CALC-MCNC: 2.8 G/DL (ref 1.9–3.5)
GLUCOSE SERPL-MCNC: 92 MG/DL (ref 65–99)
HBA1C MFR BLD: 5.3 % (ref 0–5.6)
HCT VFR BLD AUTO: 42.8 % (ref 37–47)
HDLC SERPL-MCNC: 60 MG/DL
HGB BLD-MCNC: 13.7 G/DL (ref 12–16)
IMM GRANULOCYTES # BLD AUTO: 0.01 K/UL (ref 0–0.11)
IMM GRANULOCYTES NFR BLD AUTO: 0.2 % (ref 0–0.9)
LDLC SERPL CALC-MCNC: 149 MG/DL
LYMPHOCYTES # BLD AUTO: 1.37 K/UL (ref 1–4.8)
LYMPHOCYTES NFR BLD: 31.1 % (ref 22–41)
MCH RBC QN AUTO: 29.5 PG (ref 27–33)
MCHC RBC AUTO-ENTMCNC: 32 G/DL (ref 33.6–35)
MCV RBC AUTO: 92 FL (ref 81.4–97.8)
MONOCYTES # BLD AUTO: 0.43 K/UL (ref 0–0.85)
MONOCYTES NFR BLD AUTO: 9.8 % (ref 0–13.4)
NEUTROPHILS # BLD AUTO: 2.46 K/UL (ref 2–7.15)
NEUTROPHILS NFR BLD: 55.7 % (ref 44–72)
NRBC # BLD AUTO: 0 K/UL
NRBC BLD-RTO: 0 /100 WBC
PLATELET # BLD AUTO: 249 K/UL (ref 164–446)
PMV BLD AUTO: 10.3 FL (ref 9–12.9)
POTASSIUM SERPL-SCNC: 4 MMOL/L (ref 3.6–5.5)
PROT SERPL-MCNC: 7.2 G/DL (ref 6–8.2)
RBC # BLD AUTO: 4.65 M/UL (ref 4.2–5.4)
SODIUM SERPL-SCNC: 141 MMOL/L (ref 135–145)
TRIGL SERPL-MCNC: 85 MG/DL (ref 0–149)
TSH SERPL DL<=0.005 MIU/L-ACNC: 1.47 UIU/ML (ref 0.38–5.33)
WBC # BLD AUTO: 4.4 K/UL (ref 4.8–10.8)

## 2019-09-19 PROCEDURE — 36415 COLL VENOUS BLD VENIPUNCTURE: CPT

## 2019-09-19 PROCEDURE — 80053 COMPREHEN METABOLIC PANEL: CPT

## 2019-09-19 PROCEDURE — 80061 LIPID PANEL: CPT

## 2019-09-19 PROCEDURE — 85025 COMPLETE CBC W/AUTO DIFF WBC: CPT

## 2019-09-19 PROCEDURE — 83036 HEMOGLOBIN GLYCOSYLATED A1C: CPT

## 2019-09-19 PROCEDURE — 84443 ASSAY THYROID STIM HORMONE: CPT

## 2020-04-21 ENCOUNTER — OFFICE VISIT (OUTPATIENT)
Dept: URGENT CARE | Facility: PHYSICIAN GROUP | Age: 55
End: 2020-04-21
Payer: COMMERCIAL

## 2020-04-21 VITALS
HEART RATE: 72 BPM | SYSTOLIC BLOOD PRESSURE: 120 MMHG | BODY MASS INDEX: 38.65 KG/M2 | WEIGHT: 255 LBS | HEIGHT: 68 IN | OXYGEN SATURATION: 94 % | DIASTOLIC BLOOD PRESSURE: 84 MMHG | TEMPERATURE: 97.1 F | RESPIRATION RATE: 16 BRPM

## 2020-04-21 DIAGNOSIS — H65.93 MIDDLE EAR EFFUSION, BILATERAL: ICD-10-CM

## 2020-04-21 PROCEDURE — 99214 OFFICE O/P EST MOD 30 MIN: CPT | Performed by: NURSE PRACTITIONER

## 2020-04-21 RX ORDER — VORTIOXETINE 20 MG/1
TABLET, FILM COATED ORAL
COMMUNITY
End: 2023-11-07

## 2020-04-21 RX ORDER — FLUTICASONE PROPIONATE 50 MCG
1 SPRAY, SUSPENSION (ML) NASAL DAILY
Qty: 16 G | Refills: 0 | Status: SHIPPED
Start: 2020-04-21 | End: 2020-07-01

## 2020-04-21 RX ORDER — LORATADINE 10 MG/1
1 CAPSULE, LIQUID FILLED ORAL DAILY
Qty: 30 CAP | Refills: 0 | Status: SHIPPED
Start: 2020-04-21 | End: 2020-07-01

## 2020-04-21 ASSESSMENT — ENCOUNTER SYMPTOMS
HEADACHES: 0
DIZZINESS: 0
VOMITING: 0
SORE THROAT: 0
COUGH: 0
CHILLS: 0
FEVER: 0
VISUAL CHANGE: 0
SWOLLEN GLANDS: 0
FATIGUE: 0

## 2020-04-21 ASSESSMENT — FIBROSIS 4 INDEX: FIB4 SCORE: 0.96

## 2020-04-21 NOTE — PROGRESS NOTES
Subjective:   Chioma Mock  is a 54 y.o. female who presents for Ringing in Ear (both ears, low humming x3days)        Ear Fullness   This is a new problem. Episode onset: 3 days ago. The problem occurs constantly. The problem has been unchanged. Pertinent negatives include no chills, congestion, coughing, fatigue, fever, headaches, rash, sore throat, swollen glands, visual change or vomiting. Associated symptoms comments: 54-year-old female patient reports urgent care for new problem that started 3 days ago.  Patient states that she feels like she has bilateral ear fullness and ringing.  Denies history of ear infections or seasonal allergies.  Denies runny nose, fever, chills, nausea, headache, ear drainage or ear pain.  Patient has not taken anything over-the-counter for symptom relief.. Nothing aggravates the symptoms. She has tried nothing for the symptoms.     Review of Systems   Constitutional: Negative for chills, fatigue, fever and malaise/fatigue.   HENT: Negative for congestion, ear discharge, ear pain and sore throat.    Respiratory: Negative for cough.    Gastrointestinal: Negative for vomiting.   Skin: Negative for rash.   Neurological: Negative for dizziness and headaches.     Past Medical History:   Diagnosis Date   • Adjustment reaction with anxiety and depression 8/22/2009   • Benign Neoplasm of Muscle left leg. 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14   • Major depressive disorder, recurrent episode, moderate ([facility]) 6/23/2011   • Major depressive disorder, recurrent severe without psychotic features ([facility]) 8/22/2009   • Neurodermatitis 9/28/2011   • Obesity 11/30/2010   • Preventative health care 8/22/2009   • Sleep related leg cramps 11/30/2010      Past Surgical  "History:   Procedure Laterality Date   • TUMOR EXCISION WITH BIOPSY  2006    benign tumor left lower      Social History     Socioeconomic History   • Marital status: Legally      Spouse name: Not on file   • Number of children: Not on file   • Years of education: Not on file   • Highest education level: Not on file   Occupational History   • Occupation: teacher   Social Needs   • Financial resource strain: Not on file   • Food insecurity     Worry: Not on file     Inability: Not on file   • Transportation needs     Medical: Not on file     Non-medical: Not on file   Tobacco Use   • Smoking status: Never Smoker   • Smokeless tobacco: Never Used   Substance and Sexual Activity   • Alcohol use: Yes     Alcohol/week: 0.0 oz     Comment: rarely   • Drug use: No   • Sexual activity: Yes     Partners: Male     Comment: work: teacher   Lifestyle   • Physical activity     Days per week: Not on file     Minutes per session: Not on file   • Stress: Not on file   Relationships   • Social connections     Talks on phone: Not on file     Gets together: Not on file     Attends Muslim service: Not on file     Active member of club or organization: Not on file     Attends meetings of clubs or organizations: Not on file     Relationship status: Not on file   • Intimate partner violence     Fear of current or ex partner: Not on file     Emotionally abused: Not on file     Physically abused: Not on file     Forced sexual activity: Not on file   Other Topics Concern   • Not on file   Social History Narrative    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     Norco [hydrocodone-acetaminophen]       Objective:   /84 (BP Location: Right arm, Patient Position: Sitting, BP Cuff Size: Large adult)   Pulse 72   Temp 36.2 °C (97.1 °F) (Temporal)   Resp 16   Ht 1.727 m (5' 8\")   Wt 115.7 kg (255 lb)   SpO2 94%   BMI 38.77 kg/m²   Physical Exam  Vitals signs reviewed.   Constitutional:       " Appearance: Normal appearance.   HENT:      Right Ear: Ear canal and external ear normal. A middle ear effusion is present. Tympanic membrane is bulging.      Left Ear: Ear canal and external ear normal. A middle ear effusion is present. Tympanic membrane is bulging.      Nose: Nose normal.      Mouth/Throat:      Lips: Pink.      Mouth: Mucous membranes are moist.      Pharynx: Uvula midline.      Comments: Post nasal drip present  Cardiovascular:      Rate and Rhythm: Normal rate and regular rhythm.      Heart sounds: Normal heart sounds.   Pulmonary:      Effort: Pulmonary effort is normal.      Breath sounds: Normal breath sounds.   Skin:     General: Skin is warm.   Neurological:      Mental Status: She is alert and oriented to person, place, and time.   Psychiatric:         Mood and Affect: Mood normal.         Behavior: Behavior normal.         Thought Content: Thought content normal.         Judgment: Judgment normal.           Assessment/Plan:      1. Middle ear effusion, bilateral  - fluticasone (FLONASE) 50 MCG/ACT nasal spray; Spray 1 Spray in nose every day.  Dispense: 16 g; Refill: 0  - Loratadine (CLARITIN) 10 MG Cap; Take 1 tablet by mouth every day.  Dispense: 30 Cap; Refill: 0    Discussed physical examination findings, patient presentation as well as length patient symptoms is consistent with a middle ear effusion and may be due to allergic etiology.  Will provide patient with Flonase as well as Claritin to take during the day additionally told patient that she may take Benadryl to assist with faster relief.    Supportive care, differential diagnoses, and indications for immediate follow-up discussed with patient.    Pathogenesis of diagnosis discussed including typical length and natural progression. Patient expresses understanding and agrees to plan.    Instructed patient to return to clinic for worsening symptoms or symptoms that persist for 7 to 10 days     Please note that this dictation was  created using voice recognition software. I have made every reasonable attempt to correct obvious errors, but I expect that there are errors of grammar and possibly content that I did not discover before finalizing the note.

## 2020-04-30 ENCOUNTER — OFFICE VISIT (OUTPATIENT)
Dept: URGENT CARE | Facility: PHYSICIAN GROUP | Age: 55
End: 2020-04-30
Payer: COMMERCIAL

## 2020-04-30 VITALS
SYSTOLIC BLOOD PRESSURE: 114 MMHG | DIASTOLIC BLOOD PRESSURE: 78 MMHG | TEMPERATURE: 97.8 F | OXYGEN SATURATION: 96 % | RESPIRATION RATE: 16 BRPM | HEART RATE: 87 BPM

## 2020-04-30 DIAGNOSIS — H93.13 TINNITUS AURIUM, BILATERAL: Primary | ICD-10-CM

## 2020-04-30 DIAGNOSIS — H65.93 MIDDLE EAR EFFUSION, BILATERAL: ICD-10-CM

## 2020-04-30 PROCEDURE — 99214 OFFICE O/P EST MOD 30 MIN: CPT | Performed by: PHYSICIAN ASSISTANT

## 2020-04-30 RX ORDER — METHYLPREDNISOLONE 4 MG/1
TABLET ORAL
Qty: 21 TAB | Refills: 0 | Status: SHIPPED
Start: 2020-04-30 | End: 2020-07-01

## 2020-04-30 NOTE — PROGRESS NOTES
Subjective:      Pt is a 54 y.o. female who presents with Ringing in Ear (both ears x 2 weeks)            HPI  This is a new problem. Pt notes 2 weeks of excessive ringing in her ears. Pt was seen 9 days ago at the  and given Flonase and Claritin with no relief she notes. Pt states the ringing keeps her up at night and is constant throughout the day. Pt has not taken any Rx medications for this condition. Pt states the pain is a 1/10, aching in nature and worse at night. Pt denies CP, SOB, NVD, paresthesias, headaches, dizziness, change in vision, hives, or other joint pain. The pt's medication list, problem list, and allergies have been evaluated and reviewed during today's visit.    PMH:  Past Medical History:   Diagnosis Date   • Adjustment reaction with anxiety and depression 2009   • Benign Neoplasm of Muscle left leg. 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  Family Status   Relation Name Status   • Mo  Alive   • Fa  Alive   • MGMo     • MGFa     • PGMo     • PGFa     • MAunt  (Not Specified)       Soc HX:  Social History     Socioeconomic History   • Marital status: Legally      Spouse name: Not on  file   • Number of children: Not on file   • Years of education: Not on file   • Highest education level: Not on file   Occupational History   • Occupation: teacher   Social Needs   • Financial resource strain: Not on file   • Food insecurity     Worry: Not on file     Inability: Not on file   • Transportation needs     Medical: Not on file     Non-medical: Not on file   Tobacco Use   • Smoking status: Never Smoker   • Smokeless tobacco: Never Used   Substance and Sexual Activity   • Alcohol use: Yes     Alcohol/week: 0.0 oz     Comment: rarely   • Drug use: No   • Sexual activity: Yes     Partners: Male     Comment: work: teacher   Lifestyle   • Physical activity     Days per week: Not on file     Minutes per session: Not on file   • Stress: Not on file   Relationships   • Social connections     Talks on phone: Not on file     Gets together: Not on file     Attends Episcopalian service: Not on file     Active member of club or organization: Not on file     Attends meetings of clubs or organizations: Not on file     Relationship status: Not on file   • Intimate partner violence     Fear of current or ex partner: Not on file     Emotionally abused: Not on file     Physically abused: Not on file     Forced sexual activity: Not on file   Other Topics Concern   • Not on file   Social History Narrative    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          Medications:    Current Outpatient Medications:   •  methylPREDNISolone (MEDROL DOSEPAK) 4 MG Tablet Therapy Pack, Follow schedule on package instructions., Disp: 21 Tab, Rfl: 0  •  Vortioxetine HBr (TRINTELLIX) 20 MG Tab, Take  by mouth., Disp: , Rfl:   •  fluticasone (FLONASE) 50 MCG/ACT nasal spray, Spray 1 Spray in nose every day., Disp: 16 g, Rfl: 0  •  Loratadine (CLARITIN) 10 MG Cap, Take 1 tablet by mouth every day., Disp: 30 Cap, Rfl: 0  •  buPROPion (WELLBUTRIN SR) 200 MG SR tablet, Take 200 mg by mouth 2 times a day., Disp: , Rfl:    •  modafinil (PROVIGIL) 200 MG Tab, Take 200 mg by mouth every day., Disp: , Rfl:   •  LamoTRIgine (LAMICTAL PO), Take  by mouth., Disp: , Rfl:   •  trazodone (DESYREL) 150 MG Tab, Take 150 mg by mouth every evening., Disp: , Rfl:   •  levonorgestrel (MIRENA, 52 MG,) 20 MCG/24HR IUD, 1 Each by Intrauterine route Once., Disp: , Rfl:   •  clonazepam (KLONOPIN) 0.5 MG TABS, Take 0.5 mg by mouth every bedtime., Disp: , Rfl:       Allergies:  Norco [hydrocodone-acetaminophen]    ROS  Constitutional: Negative for fever, chills and malaise/fatigue.   HENT: Negative for congestion and sore throat.  +B/L ear ringing  Eyes: Negative for blurred vision, double vision and photophobia.   Respiratory: Negative for cough and shortness of breath.  Cardiovascular: Negative for chest pain and palpitations.   Gastrointestinal: Negative for heartburn, nausea, vomiting, abdominal pain, diarrhea and constipation.   Genitourinary: Negative for dysuria and flank pain.   Musculoskeletal: Negative for joint pain and myalgias.   Skin: Negative for itching and rash.   Neurological: Negative for dizziness, tingling and headaches.   Endo/Heme/Allergies: Does not bruise/bleed easily.   Psychiatric/Behavioral: Negative for depression. The patient is not nervous/anxious.           Objective:     /78 (BP Location: Left arm, Patient Position: Sitting, BP Cuff Size: Large adult)   Pulse 87   Temp 36.6 °C (97.8 °F) (Temporal)   Resp 16   SpO2 96%      Physical Exam    Constitutional: PT is oriented to person, place, and time.   HENT:   Head: Normocephalic and atraumatic.   Ears: Hearing, external ear and ear canal normal. B/L TM effusions  Nose: Nose normal.   Mouth/Throat: Oropharynx is clear and moist. No oropharyngeal exudate.   Eyes: Conjunctivae normal and EOM are normal. Pupils are equal, round, and reactive to light.   Neck: Normal range of motion. Neck supple. No thyromegaly present.   Cardiovascular: Normal rate, regular rhythm,  normal heart sounds and intact distal pulses.  Exam reveals no gallop and no friction rub.    No murmur heard.  Pulmonary/Chest: Effort normal and breath sounds normal. No respiratory distress. PT has no wheezes. PT has no rales. PT exhibits no tenderness.   Abdominal: Soft. Bowel sounds are normal. PT exhibits no distension and no mass. There is no tenderness. There is no rebound and no guarding.   Musculoskeletal: Normal range of motion. PT exhibits no edema and no tenderness.   Neurological: PT is alert and oriented to person, place, and time. No cranial nerve deficit.   Skin: Skin is warm and dry. No rash noted. No erythema.   Psychiatric: PT has a normal mood and affect. PT behavior is normal. Judgment and thought content normal.            Assessment/Plan:       1. Tinnitus aurium, bilateral    - methylPREDNISolone (MEDROL DOSEPAK) 4 MG Tablet Therapy Pack; Follow schedule on package instructions.  Dispense: 21 Tab; Refill: 0  - REFERRAL TO ENT    2. Middle ear effusion, bilateral    - methylPREDNISolone (MEDROL DOSEPAK) 4 MG Tablet Therapy Pack; Follow schedule on package instructions.  Dispense: 21 Tab; Refill: 0  - REFERRAL TO ENT      Rest, fluids encouraged.  OTC decongestant for congestion  AVS with medical info given.  Pt was in full understanding and agreement with the plan.  Differential diagnosis, natural history, supportive care, and indications for immediate follow-up discussed. All questions answered. Patient agrees with the plan of care.  Follow-up as needed if symptoms worsen or fail to improve to PCP, Urgent care or Emergency Room.

## 2020-05-01 ENCOUNTER — TELEPHONE (OUTPATIENT)
Dept: SCHEDULING | Facility: IMAGING CENTER | Age: 55
End: 2020-05-01

## 2020-07-01 ENCOUNTER — OFFICE VISIT (OUTPATIENT)
Dept: MEDICAL GROUP | Facility: PHYSICIAN GROUP | Age: 55
End: 2020-07-01
Payer: COMMERCIAL

## 2020-07-01 VITALS
OXYGEN SATURATION: 95 % | SYSTOLIC BLOOD PRESSURE: 118 MMHG | TEMPERATURE: 97.8 F | BODY MASS INDEX: 42.59 KG/M2 | HEIGHT: 68 IN | RESPIRATION RATE: 14 BRPM | DIASTOLIC BLOOD PRESSURE: 78 MMHG | WEIGHT: 281 LBS | HEART RATE: 84 BPM

## 2020-07-01 DIAGNOSIS — Z23 NEED FOR VACCINATION: ICD-10-CM

## 2020-07-01 DIAGNOSIS — Z00.00 GENERAL MEDICAL EXAM: ICD-10-CM

## 2020-07-01 DIAGNOSIS — M25.562 CHRONIC PAIN OF BOTH KNEES: ICD-10-CM

## 2020-07-01 DIAGNOSIS — Z12.11 COLON CANCER SCREENING: ICD-10-CM

## 2020-07-01 DIAGNOSIS — H93.13 TINNITUS OF BOTH EARS: ICD-10-CM

## 2020-07-01 DIAGNOSIS — Z12.4 SCREENING FOR CERVICAL CANCER: ICD-10-CM

## 2020-07-01 DIAGNOSIS — Z97.5 IUD (INTRAUTERINE DEVICE) IN PLACE: ICD-10-CM

## 2020-07-01 DIAGNOSIS — E78.2 MIXED HYPERLIPIDEMIA: ICD-10-CM

## 2020-07-01 DIAGNOSIS — M25.561 CHRONIC PAIN OF BOTH KNEES: ICD-10-CM

## 2020-07-01 DIAGNOSIS — E66.01 CLASS 3 SEVERE OBESITY WITH BODY MASS INDEX (BMI) OF 40.0 TO 44.9 IN ADULT, UNSPECIFIED OBESITY TYPE, UNSPECIFIED WHETHER SERIOUS COMORBIDITY PRESENT (HCC): ICD-10-CM

## 2020-07-01 DIAGNOSIS — E55.9 VITAMIN D INSUFFICIENCY: ICD-10-CM

## 2020-07-01 DIAGNOSIS — E66.01 MORBID OBESITY WITH BMI OF 40.0-44.9, ADULT (HCC): ICD-10-CM

## 2020-07-01 DIAGNOSIS — G47.09 OTHER INSOMNIA: ICD-10-CM

## 2020-07-01 DIAGNOSIS — F41.9 ANXIETY: ICD-10-CM

## 2020-07-01 DIAGNOSIS — F33.2 MAJOR DEPRESSIVE DISORDER, RECURRENT SEVERE WITHOUT PSYCHOTIC FEATURES (HCC): ICD-10-CM

## 2020-07-01 DIAGNOSIS — G89.29 CHRONIC PAIN OF BOTH KNEES: ICD-10-CM

## 2020-07-01 DIAGNOSIS — Z12.39 SCREENING FOR BREAST CANCER: ICD-10-CM

## 2020-07-01 PROBLEM — M79.671 BILATERAL FOOT PAIN: Status: RESOLVED | Noted: 2017-06-14 | Resolved: 2020-07-01

## 2020-07-01 PROBLEM — M79.672 BILATERAL FOOT PAIN: Status: RESOLVED | Noted: 2017-06-14 | Resolved: 2020-07-01

## 2020-07-01 PROBLEM — L03.116 CELLULITIS OF LEFT LOWER EXTREMITY: Status: RESOLVED | Noted: 2017-09-29 | Resolved: 2020-07-01

## 2020-07-01 PROCEDURE — 99214 OFFICE O/P EST MOD 30 MIN: CPT | Performed by: PHYSICIAN ASSISTANT

## 2020-07-01 RX ORDER — TRAZODONE HYDROCHLORIDE 50 MG/1
50 TABLET ORAL NIGHTLY
COMMUNITY
End: 2021-09-28

## 2020-07-01 RX ORDER — LORATADINE 10 MG/1
TABLET ORAL
COMMUNITY
Start: 2020-04-21 | End: 2020-07-01

## 2020-07-01 ASSESSMENT — PATIENT HEALTH QUESTIONNAIRE - PHQ9
8. MOVING OR SPEAKING SO SLOWLY THAT OTHER PEOPLE COULD HAVE NOTICED. OR THE OPPOSITE, BEING SO FIGETY OR RESTLESS THAT YOU HAVE BEEN MOVING AROUND A LOT MORE THAN USUAL: NOT AT ALL
2. FEELING DOWN, DEPRESSED, IRRITABLE, OR HOPELESS: SEVERAL DAYS
3. TROUBLE FALLING OR STAYING ASLEEP OR SLEEPING TOO MUCH: NOT AT ALL
SUM OF ALL RESPONSES TO PHQ9 QUESTIONS 1 AND 2: 2
5. POOR APPETITE OR OVEREATING: NEARLY EVERY DAY
1. LITTLE INTEREST OR PLEASURE IN DOING THINGS: SEVERAL DAYS
7. TROUBLE CONCENTRATING ON THINGS, SUCH AS READING THE NEWSPAPER OR WATCHING TELEVISION: MORE THAN HALF THE DAYS
9. THOUGHTS THAT YOU WOULD BE BETTER OFF DEAD, OR OF HURTING YOURSELF: NOT AT ALL

## 2020-07-01 ASSESSMENT — FIBROSIS 4 INDEX: FIB4 SCORE: 0.96

## 2020-07-01 NOTE — ASSESSMENT & PLAN NOTE
Klonopin 0.5 mg and trazodone 50 mg at night works well. Discussed sleeping habits- doesn't snore or wake up gasping for breath. Insomnia issues due to mind being overactive.

## 2020-07-01 NOTE — ASSESSMENT & PLAN NOTE
This is a chronic condition.   Latest Labs:   Lab Results   Component Value Date/Time    CHOLSTRLTOT 226 (H) 09/19/2019 08:47 AM     (H) 09/19/2019 08:47 AM    HDL 60 09/19/2019 08:47 AM    TRIGLYCERIDE 85 09/19/2019 08:47 AM      Medications: none  Diet/Exercise: Terrible, need improvement at both.   Family History of high cholesterol or heart disease? Not sure.  Risk calculator: The 10-year ASCVD risk score (Lindencheryl ACEVEDO Jr., et al., 2013) is: 1.3%

## 2020-07-01 NOTE — ASSESSMENT & PLAN NOTE
"Patient is struggled with her weight for many years.  She admits that her diet and exercise are \"terrible\".  She is motivated and wants to lose weight.  She is unsure how to do so.  She is waiting for her gym at her complex to reopen.  We discussed seeing a dietitian today.  "

## 2020-07-01 NOTE — ASSESSMENT & PLAN NOTE
Went to Dr. Glaser- CT head done- she was told benign process. Doesn't bother her unless it is super quiet. She uses sound machine as needed and this helps as well.

## 2020-07-01 NOTE — PROGRESS NOTES
CC:    Chief Complaint   Patient presents with   • Establish Care        HISTORY OF THE PRESENT ILLNESS: Patient is a 54 y.o. female. This pleasant patient is here today establish care and discuss the following conditions.     Health Maintenance: Completed  Will print shingrix vaccine.   Discussed colonoscopy, no family colon cancer. Agrees to get colonoscopy.         IUD (intrauterine device) in place  Mirena in place, IUD placed about 7 years ago. States last gyn couldn't grasp strings, needed to make f/u for removal, has not done so yet.     Major depressive disorder, recurrent severe without psychotic features  Wellbutrin  mg BID, comes and goes, in partial remission. She is seeing psychiatrist and psychologist Dr. Barkley and Dr. Lacy respectively. On trintellix as well. And provigil 200 mg. She was getting tired during day, provigil helps her a lot.     Anxiety  Stable, chronic condition. Followed by psychiatry and psychology. Onset 11 years ago. On wellbutrin  mg BID, klonopin 0.5 mg TID, and trazodone 50 mg at night for insomnia.       Insomnia  Klonopin 0.5 mg and trazodone 50 mg at night works well. Discussed sleeping habits- doesn't snore or wake up gasping for breath. Insomnia issues due to mind being overactive.     Mixed hyperlipidemia  This is a chronic condition.   Latest Labs:   Lab Results   Component Value Date/Time    CHOLSTRLTOT 226 (H) 09/19/2019 08:47 AM     (H) 09/19/2019 08:47 AM    HDL 60 09/19/2019 08:47 AM    TRIGLYCERIDE 85 09/19/2019 08:47 AM      Medications: none  Diet/Exercise: Terrible, need improvement at both.   Family History of high cholesterol or heart disease? Not sure.  Risk calculator: The 10-year ASCVD risk score ([place] DC Jr., et al., 2013) is: 1.3%       Tinnitus of both ears  Went to Dr. Glaser- CT head done- she was told benign process. Doesn't bother her unless it is super quiet. She uses sound machine as needed and this helps as well.     Vitamin D  "insufficiency  This is a  chronic condition.   Supplementation: none currently, recommend 2000 IU daily.   Last Vitamin D level:   VIT D:   Lab Results   Component Value Date/Time    25HYDROXY 27 (L) 02/10/2018 1143     Patient denies any muscle aches or fatigue.       Chronic pain of both knees  Complains of bilateral knee pain, progressive worsening the last year. Aggravated by going up and down stairs. Anterior knee pain. Advil helps the pain. Aggravated if she is carrying or moving things. With just normal walking no pain. No redness or swelling.     Morbid obesity with BMI of 40.0-44.9, adult (HCC)  Patient is struggled with her weight for many years.  She admits that her diet and exercise are \"terrible\".  She is motivated and wants to lose weight.  She is unsure how to do so.  She is waiting for her gym at her complex to reopen.  We discussed seeing a dietitian today.    Allergies: Norco [hydrocodone-acetaminophen]    Current Outpatient Medications Ordered in Epic   Medication Sig Dispense Refill   • traZODone (DESYREL) 50 MG Tab Take 50 mg by mouth every evening.     • Zoster Vac Recomb Adjuvanted (SHINGRIX) 50 MCG/0.5ML Recon Susp 0.5 mL by Intramuscular route Once for 1 dose. 0.5 mL 0   • Vortioxetine HBr (TRINTELLIX) 20 MG Tab Take  by mouth.     • buPROPion (WELLBUTRIN SR) 200 MG SR tablet Take 200 mg by mouth 2 times a day.     • modafinil (PROVIGIL) 200 MG Tab Take 200 mg by mouth every day.     • levonorgestrel (MIRENA, 52 MG,) 20 MCG/24HR IUD 1 Each by Intrauterine route Once.     • clonazepam (KLONOPIN) 0.5 MG TABS Take 0.5 mg by mouth every bedtime.       No current Epic-ordered facility-administered medications on file.        Past Medical History:   Diagnosis Date   • Adjustment reaction with anxiety and depression 8/22/2009   • Benign Neoplasm of Muscle left leg. 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        Family History   Problem Relation Age of Onset   • Diabetes Father    • Hypertension Father    • Cancer Maternal Aunt         breast       ROS:   Constitutional: Patient denies any fever, chills, night sweats, fatigue, or malaise.  Patient struggling with weight loss regimen, I asked to see a dietitian today.  See HPI.  Eyes: Patient denies any VA changes.   ENT: Patient denies any runny nose, hoarseness, sore throat, or dysphagia.  Recently diagnosed with bilateral tinnitus, followed by ENT, see HPI.  Resp: Patient denies cough, wheezing, or shortness of breath.   CV: Patient denies any chest pain, claudication, cyanosis, palpitations, or edema.   GI: Patient denies any constipation, nausea, vomiting, diarrhea, bloating, abdominal pain, or dyspepsia.   MSK: Patient complains of chronic bilateral knee pain, see HPI.    Skin: Patient denies any color changes, skin changes, lesions, ulcerations, wounds, pruritus, hair or nail changes.   Neuro: Patient denies any headache, numbness or tingling  Psych: Patient history of depression  "anxiety, being followed by psychology and psychiatry, see HPI.      Exam: /78   Pulse 84   Temp 36.6 °C (97.8 °F) (Temporal)   Resp 14   Ht 1.715 m (5' 7.5\")   Wt (!) 127.5 kg (281 lb)   SpO2 95%  Body mass index is 43.36 kg/m².      GENERAL: No acute distress, appropriately dressed. Well developed female.   HENT: Atraumatic, normocephalic, EAC's clear without impaction. TM's pearly gray and translucent. Nose is patent. Mucosa is pink and moist. Lips are pink without lesions. Uvula midline.   EYES: Extraocular movements intact, pupils equal and reactive to light  NECK: Supple, FROM. No thyromegaly appreciated. No lymphadenopathy (submandibular, anterior/posterior cervical, and supraclavicular lymph nodes palpated) or masses. No bruits appreciated.   CHEST: No deformities, Equal chest expansion  HEART: Regular rate and rhythm, no murmur  RESP: Clear to auscultation bilaterally without wheezes, rales or rhonchi.   ABD: Soft, Nontender, Non-Distended  Extremities: No Clubbing, Cyanosis, or Edema  Skin: Warm/dry, no rashes.   Neuro: A/O x 4, CN 2-12 Grossly intact, Motor/sensory grossly intact  Psych: Normal behavior, normal affect      Assessment/Plan:  1. Screening for breast cancer  - MA-SCREENING MAMMO BILAT W/TOMOSYNTHESIS W/CAD; Future    2. Screening for cervical cancer  - REFERRAL TO GYNECOLOGY  3. IUD (intrauterine device) in place  Mirena IUD about 7 years old, needs to be removed.  Referred to GYN.  4. Major depressive disorder, recurrent severe without psychotic features (HCC)  5. Anxiety  6. Other insomnia  Depression, insomnia, anxiety, being followed by psychology and psychiatry.  Continue Wellbutrin 200 mg SR twice a day, she is on Klonopin 0.5 mg 3 times a day and takes trazodone 50 mg at night.  This regimen works well for her.  Continue follow-up with specialist.  She is also on Provigil 200 mg a day to help with daytime somnolence.  This works well for her as well.  7. Mixed " hyperlipidemia  - Lipid Profile; Future  Chronic condition.  No medications currently.  Discussed lifestyle modifications.  She is going to see a dietitian.  8. Tinnitus of both ears  Chronic condition.  She uses sound machine as needed to help reduce her symptoms.  Does not bother her unless it is very quiet.  9. Vitamin D insufficiency  Chronic condition.  Due for updated labs.  Recommend supplementing 2000 IUs a day.  10. Need for vaccination  - Zoster Vac Recomb Adjuvanted (SHINGRIX) 50 MCG/0.5ML Recon Susp; 0.5 mL by Intramuscular route Once for 1 dose.  Dispense: 0.5 mL; Refill: 0    11. Chronic pain of both knees  Chronic condition.  Stable.  Discussed referral to Ortho if needed in the future.  She would like to wait right now.  Discussed weight loss regimen to help aid her joint pain.  12. Colon cancer screening  - REFERRAL TO GI FOR COLONOSCOPY    13. General medical exam  - CBC WITH DIFFERENTIAL; Future  - Comp Metabolic Panel; Future  - TSH; Future  - VITAMIN D,25 HYDROXY; Future    14. Class 3 severe obesity with body mass index (BMI) of 40.0 to 44.9 in adult, unspecified obesity type, unspecified whether serious comorbidity present (HCC)  - REFERRAL TO [facility] (HIP) Services Requested: Registered Dietitian for Medical Nutrition Therapy; Reason for Visit: Overweight/Obesity  15. Morbid obesity with BMI of 40.0-44.9, adult ([facility])  Referred patient to registered dietitian for nutritional therapy.  Discussed exercise regimen as well, recommending 30 minutes of cardio exercise 3-5 times a week.  She does enjoy walking, she is also waiting for her gym in her complex to reopen.  Other orders  - traZODone (DESYREL) 50 MG Tab; Take 50 mg by mouth every evening.       Follow-up: Return in about 8 weeks (around 8/26/2020) for Follow up.    Please note that this dictation was created using voice recognition software. I have made every reasonable attempt to correct obvious errors, but I  expect that there are errors of grammar and possibly content that I did not discover before finalizing the note.

## 2020-07-01 NOTE — ASSESSMENT & PLAN NOTE
Mirena in place, IUD placed about 7 years ago. States last gyn couldn't grasp strings, needed to make f/u for removal, has not done so yet.

## 2020-07-01 NOTE — ASSESSMENT & PLAN NOTE
Complains of bilateral knee pain, progressive worsening the last year. Aggravated by going up and down stairs. Anterior knee pain. Advil helps the pain. Aggravated if she is carrying or moving things. With just normal walking no pain. No redness or swelling.

## 2020-07-01 NOTE — ASSESSMENT & PLAN NOTE
This is a  chronic condition.   Supplementation: none currently, recommend 2000 IU daily.   Last Vitamin D level:   VIT D:   Lab Results   Component Value Date/Time    25HYDROXY 27 (L) 02/10/2018 1143     Patient denies any muscle aches or fatigue.

## 2020-07-01 NOTE — ASSESSMENT & PLAN NOTE
Stable, chronic condition. Followed by psychiatry and psychology. Onset 11 years ago. On wellbutrin  mg BID, klonopin 0.5 mg TID, and trazodone 50 mg at night for insomnia.

## 2020-07-01 NOTE — ASSESSMENT & PLAN NOTE
Wellbutrin  mg BID, comes and goes, in partial remission. She is seeing psychiatrist and psychologist Dr. Barkley and Dr. Lacy respectively. On trintellix as well. And provigil 200 mg. She was getting tired during day, provigil helps her a lot.

## 2020-10-05 ENCOUNTER — HOSPITAL ENCOUNTER (OUTPATIENT)
Dept: RADIOLOGY | Facility: MEDICAL CENTER | Age: 55
End: 2020-10-05
Attending: PHYSICIAN ASSISTANT
Payer: COMMERCIAL

## 2020-10-05 ENCOUNTER — OFFICE VISIT (OUTPATIENT)
Dept: MEDICAL GROUP | Facility: PHYSICIAN GROUP | Age: 55
End: 2020-10-05
Payer: COMMERCIAL

## 2020-10-05 VITALS
RESPIRATION RATE: 16 BRPM | WEIGHT: 275 LBS | TEMPERATURE: 97.5 F | SYSTOLIC BLOOD PRESSURE: 134 MMHG | BODY MASS INDEX: 41.68 KG/M2 | OXYGEN SATURATION: 95 % | HEIGHT: 68 IN | HEART RATE: 73 BPM | DIASTOLIC BLOOD PRESSURE: 88 MMHG

## 2020-10-05 DIAGNOSIS — G89.29 CHRONIC PAIN OF BOTH KNEES: ICD-10-CM

## 2020-10-05 DIAGNOSIS — M25.561 CHRONIC PAIN OF BOTH KNEES: ICD-10-CM

## 2020-10-05 DIAGNOSIS — Z23 NEED FOR VACCINATION: ICD-10-CM

## 2020-10-05 DIAGNOSIS — Z12.39 SCREENING FOR BREAST CANCER: ICD-10-CM

## 2020-10-05 DIAGNOSIS — E66.01 MORBID OBESITY WITH BMI OF 40.0-44.9, ADULT (HCC): ICD-10-CM

## 2020-10-05 DIAGNOSIS — M25.562 CHRONIC PAIN OF BOTH KNEES: ICD-10-CM

## 2020-10-05 DIAGNOSIS — M79.672 LEFT FOOT PAIN: ICD-10-CM

## 2020-10-05 PROCEDURE — 90471 IMMUNIZATION ADMIN: CPT | Performed by: PHYSICIAN ASSISTANT

## 2020-10-05 PROCEDURE — 90686 IIV4 VACC NO PRSV 0.5 ML IM: CPT | Performed by: PHYSICIAN ASSISTANT

## 2020-10-05 PROCEDURE — 77067 SCR MAMMO BI INCL CAD: CPT

## 2020-10-05 PROCEDURE — 99213 OFFICE O/P EST LOW 20 MIN: CPT | Mod: 25 | Performed by: PHYSICIAN ASSISTANT

## 2020-10-05 RX ORDER — BUSPIRONE HYDROCHLORIDE 7.5 MG/1
TABLET ORAL
COMMUNITY
Start: 2020-10-01 | End: 2021-09-28

## 2020-10-05 ASSESSMENT — FIBROSIS 4 INDEX: FIB4 SCORE: 0.96

## 2020-10-05 NOTE — ASSESSMENT & PLAN NOTE
Doing better, being more active at work. She is down 6 pounds since last visit. Still unsure about diet. Referred dietician last visit- never got a call, will give referral information.     As far as diet- snacking is more problematic. Grazing issue.

## 2020-10-05 NOTE — PROGRESS NOTES
CC:   Chief Complaint   Patient presents with   • Knee Pain     Bilateral Knee Px, Top of L. Foot Px x 6 Mo.          HISTORY OF PRESENT ILLNESS: Patient is a 54 y.o. female established patient who presents today to follow up on weight loss regimen, high cholesterol.     Health Maintenance: Completed    Morbid obesity with BMI of 40.0-44.9, adult ([facility])  Doing better, being more active at work. She is down 6 pounds since last visit. Still unsure about diet. Referred dietician last visit- never got a call, will give referral information.     As far as diet- snacking is more problematic. Grazing issue.     Chronic pain of both knees  Continues to have knee pain, She is working on losing weight, has lost 6 pounds. Alternates knees sometimes. No instability issues. Takes Advil as needed. Wants to wait on ortho, will get xrays to evaluate further.     Left foot pain  4-5 months left foot pain- aches on top of foot. Some swelling, no redness. Aches at night. No injuries.       Patient Active Problem List    Diagnosis Date Noted   • IUD (intrauterine device) in place 07/01/2020   • Anxiety 07/01/2020   • Tinnitus of both ears 07/01/2020   • Chronic pain of both knees 07/01/2020   • Mixed hyperlipidemia 03/28/2018   • Morbid obesity with BMI of 40.0-44.9, adult ([facility]) 09/29/2017   • Left foot pain 06/14/2017   • Perimenopausal 01/21/2013   • Vitamin D insufficiency 07/09/2011   • Major depressive disorder, recurrent severe without psychotic features ([facility]) 08/22/2009   • Benign Neoplasm of Muscle left leg. 08/22/2009   • Insomnia 08/22/2009      Allergies:Norco [hydrocodone-acetaminophen]    Current Outpatient Medications   Medication Sig Dispense Refill   • traZODone (DESYREL) 50 MG Tab Take 50 mg by mouth every evening.     • Vortioxetine HBr (TRINTELLIX) 20 MG Tab Take  by mouth.     • buPROPion (WELLBUTRIN SR) 200 MG SR tablet Take 200 mg by mouth 2 times a day.     • modafinil (PROVIGIL) 200 MG Tab Take 200 mg by mouth  "every day.     • levonorgestrel (MIRENA, 52 MG,) 20 MCG/24HR IUD 1 Each by Intrauterine route Once.     • clonazepam (KLONOPIN) 0.5 MG TABS Take 0.5 mg by mouth every bedtime.       No current facility-administered medications for this visit.        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        Family History   Problem Relation Age of Onset   • Diabetes Father    • Hypertension Father    • Cancer Maternal Aunt         breast       Review of Systems:    Constitutional: No Fevers, Chills  Eyes: No vision changes  ENT: No hearing changes  Resp: No Shortness of breath  CV: No Chest pain  GI: No Nausea/Vomiting  MSK: No weakness  Skin: No rashes  Neuro: No Headaches  Psych: No Suicidal ideations    All remaining systems reviewed and found to be negative, except as stated above.    Exam:    /88 (BP Location: Left arm, Patient Position: Sitting, BP Cuff Size: Adult long)   Pulse 73   Temp 36.4 °C (97.5 °F) (Temporal)   Resp 16   Ht 1.727 m (5' 8\")   Wt 124.7 kg (275 lb)   SpO2 95%  Body mass index is 41.81 kg/m².    General:  Well nourished, well developed female in NAD  HENT: Atraumatic, normocephalic  EYES: Extraocular movements intact  NECK: Supple, FROM  CHEST: No deformities, Equal chest expansion  RESP: Unlabored, no stridor or audible wheeze  HEART: Regular Rate and rhythm.   ABD: Soft, Nontender, Non-Distended  Extremities: No Clubbing, Cyanosis, or Edema  Knees: Full range of motion of both knees, no edema or erythema, negative anterior and posterior drawer sign.  Nontender to palpation.  No reproducible pain with valgus or varus pressure bilaterally.  Left foot: Essentially nontender to palpation, normal gait.  No " obvious swelling.  Skin: Warm/dry, without rashes  Neuro: A/O x 4, due to COVID-19- did not have patient remove face mask to test cranial nerves.  Motor/sensory grossly intact  Psych: Normal behavior, normal affect      Lab review:  Labs are reviewed and discussed with a patient  Lab Results   Component Value Date/Time    CHOLSTRLTOT 226 (H) 09/19/2019 08:47 AM     (H) 09/19/2019 08:47 AM    HDL 60 09/19/2019 08:47 AM    TRIGLYCERIDE 85 09/19/2019 08:47 AM       Lab Results   Component Value Date/Time    SODIUM 141 09/19/2019 08:47 AM    POTASSIUM 4.0 09/19/2019 08:47 AM    CHLORIDE 104 09/19/2019 08:47 AM    CO2 30 09/19/2019 08:47 AM    GLUCOSE 92 09/19/2019 08:47 AM    BUN 20 09/19/2019 08:47 AM    CREATININE 0.77 09/19/2019 08:47 AM     Lab Results   Component Value Date/Time    ALKPHOSPHAT 92 09/19/2019 08:47 AM    ASTSGOT 22 09/19/2019 08:47 AM    ALTSGPT 23 09/19/2019 08:47 AM    TBILIRUBIN 0.5 09/19/2019 08:47 AM      Lab Results   Component Value Date/Time    HBA1C 5.3 09/19/2019 08:47 AM    HBA1C 5.5 11/19/2014 07:41 AM     No results found for: TSH  Lab Results   Component Value Date/Time    FREET4 0.82 01/11/2013 09:11 AM       Lab Results   Component Value Date/Time    WBC 4.4 (L) 09/19/2019 08:47 AM    RBC 4.65 09/19/2019 08:47 AM    HEMOGLOBIN 13.7 09/19/2019 08:47 AM    HEMATOCRIT 42.8 09/19/2019 08:47 AM    MCV 92.0 09/19/2019 08:47 AM    MCH 29.5 09/19/2019 08:47 AM    MCHC 32.0 (L) 09/19/2019 08:47 AM    MPV 10.3 09/19/2019 08:47 AM    NEUTSPOLYS 55.70 09/19/2019 08:47 AM    LYMPHOCYTES 31.10 09/19/2019 08:47 AM    MONOCYTES 9.80 09/19/2019 08:47 AM    EOSINOPHILS 1.60 09/19/2019 08:47 AM    BASOPHILS 1.60 09/19/2019 08:47 AM    HYPOCHROMIA 1+ 11/23/2013 10:12 AM          Assessment/Plan:  1. Need for vaccination  - Influenza Vaccine Quad Injection (PF)    2. Morbid obesity with BMI of 40.0-44.9, adult (HCC)  Doing better, down 6 pounds, f/u with dietician.  Gave her referral information  today.  We discussed in detail today some tools to try to continue her weight loss journey.  Again she is done well so far.  Recommend she find ways to reduce her snacking.  Recommend no fast food.  Discussed meal prepping on the weekends.  Needs to increase exercise, recommend going for walks.  3. Chronic pain of both knees  Suspect arthritis, discussed treatment regimen as far as continuing her weight loss journey, continue exercise and movement of the knees, ibuprofen as needed.    Getting x-ray for further evaluation.  4. Left foot pain  X-ray for further evaluation, again suspect could be arthritic in nature.  Recommend she continue her weight loss journey and could improve her symptoms.    Follow-up: Return in about 4 weeks (around 11/2/2020).    Please note that this dictation was created using voice recognition software. I have made every reasonable attempt to correct obvious errors, but I expect that there are errors of grammar and possibly content that I did not discover before finalizing the note.

## 2020-10-05 NOTE — ASSESSMENT & PLAN NOTE
4-5 months left foot pain- aches on top of foot. Some swelling, no redness. Aches at night. No injuries.

## 2020-10-05 NOTE — ASSESSMENT & PLAN NOTE
Continues to have knee pain, She is working on losing weight, has lost 6 pounds. Alternates knees sometimes. No instability issues. Takes Advil as needed. Wants to wait on ortho, will get xrays to evaluate further.

## 2020-10-06 ENCOUNTER — TELEPHONE (OUTPATIENT)
Dept: MEDICAL GROUP | Facility: PHYSICIAN GROUP | Age: 55
End: 2020-10-06

## 2020-10-06 DIAGNOSIS — R92.8 ABNORMAL MAMMOGRAM: ICD-10-CM

## 2020-10-06 NOTE — TELEPHONE ENCOUNTER
----- Message from Zoey Garcia P.A.-C. sent at 10/6/2020 12:53 PM PDT -----  Please call patient about their results.     Results showed: Circumscribed mass in the right breast, right ultrasound recommended.  Have placed order.    If you have any questions or concerns, do not hesitate to contact me or my Medical Assistant. Thank you for your time today.     Respectfully,     Zoey Garcia PA-C

## 2020-10-06 NOTE — PROGRESS NOTES
Well-circumscribed mass in the right breast found on mammogram, right breast ultrasound recommended.

## 2020-10-07 ENCOUNTER — HOSPITAL ENCOUNTER (OUTPATIENT)
Dept: LAB | Facility: MEDICAL CENTER | Age: 55
End: 2020-10-07
Attending: PHYSICIAN ASSISTANT
Payer: COMMERCIAL

## 2020-10-07 ENCOUNTER — HOSPITAL ENCOUNTER (OUTPATIENT)
Dept: RADIOLOGY | Facility: MEDICAL CENTER | Age: 55
End: 2020-10-07
Attending: PHYSICIAN ASSISTANT
Payer: COMMERCIAL

## 2020-10-07 ENCOUNTER — TELEPHONE (OUTPATIENT)
Dept: MEDICAL GROUP | Facility: PHYSICIAN GROUP | Age: 55
End: 2020-10-07

## 2020-10-07 DIAGNOSIS — G89.29 CHRONIC PAIN OF BOTH KNEES: ICD-10-CM

## 2020-10-07 DIAGNOSIS — M25.561 CHRONIC PAIN OF BOTH KNEES: ICD-10-CM

## 2020-10-07 DIAGNOSIS — M79.672 LEFT FOOT PAIN: ICD-10-CM

## 2020-10-07 DIAGNOSIS — Z00.00 GENERAL MEDICAL EXAM: ICD-10-CM

## 2020-10-07 DIAGNOSIS — M25.562 CHRONIC PAIN OF BOTH KNEES: ICD-10-CM

## 2020-10-07 DIAGNOSIS — E78.2 MIXED HYPERLIPIDEMIA: ICD-10-CM

## 2020-10-07 LAB
ALBUMIN SERPL BCP-MCNC: 4.3 G/DL (ref 3.2–4.9)
ALBUMIN/GLOB SERPL: 1.4 G/DL
ALP SERPL-CCNC: 113 U/L (ref 30–99)
ALT SERPL-CCNC: 23 U/L (ref 2–50)
ANION GAP SERPL CALC-SCNC: 11 MMOL/L (ref 7–16)
AST SERPL-CCNC: 19 U/L (ref 12–45)
BASOPHILS # BLD AUTO: 1.4 % (ref 0–1.8)
BASOPHILS # BLD: 0.07 K/UL (ref 0–0.12)
BILIRUB SERPL-MCNC: 0.3 MG/DL (ref 0.1–1.5)
BUN SERPL-MCNC: 15 MG/DL (ref 8–22)
CALCIUM SERPL-MCNC: 8.9 MG/DL (ref 8.5–10.5)
CHLORIDE SERPL-SCNC: 101 MMOL/L (ref 96–112)
CHOLEST SERPL-MCNC: 248 MG/DL (ref 100–199)
CO2 SERPL-SCNC: 28 MMOL/L (ref 20–33)
CREAT SERPL-MCNC: 0.73 MG/DL (ref 0.5–1.4)
EOSINOPHIL # BLD AUTO: 0.11 K/UL (ref 0–0.51)
EOSINOPHIL NFR BLD: 2.2 % (ref 0–6.9)
ERYTHROCYTE [DISTWIDTH] IN BLOOD BY AUTOMATED COUNT: 40.5 FL (ref 35.9–50)
FASTING STATUS PATIENT QL REPORTED: NORMAL
GLOBULIN SER CALC-MCNC: 3 G/DL (ref 1.9–3.5)
GLUCOSE SERPL-MCNC: 87 MG/DL (ref 65–99)
HCT VFR BLD AUTO: 43.4 % (ref 37–47)
HDLC SERPL-MCNC: 70 MG/DL
HGB BLD-MCNC: 14.2 G/DL (ref 12–16)
IMM GRANULOCYTES # BLD AUTO: 0.01 K/UL (ref 0–0.11)
IMM GRANULOCYTES NFR BLD AUTO: 0.2 % (ref 0–0.9)
LDLC SERPL CALC-MCNC: 159 MG/DL
LYMPHOCYTES # BLD AUTO: 1.36 K/UL (ref 1–4.8)
LYMPHOCYTES NFR BLD: 27.7 % (ref 22–41)
MCH RBC QN AUTO: 29.8 PG (ref 27–33)
MCHC RBC AUTO-ENTMCNC: 32.7 G/DL (ref 33.6–35)
MCV RBC AUTO: 91 FL (ref 81.4–97.8)
MONOCYTES # BLD AUTO: 0.43 K/UL (ref 0–0.85)
MONOCYTES NFR BLD AUTO: 8.8 % (ref 0–13.4)
NEUTROPHILS # BLD AUTO: 2.93 K/UL (ref 2–7.15)
NEUTROPHILS NFR BLD: 59.7 % (ref 44–72)
NRBC # BLD AUTO: 0 K/UL
NRBC BLD-RTO: 0 /100 WBC
PLATELET # BLD AUTO: 272 K/UL (ref 164–446)
PMV BLD AUTO: 10 FL (ref 9–12.9)
POTASSIUM SERPL-SCNC: 4.2 MMOL/L (ref 3.6–5.5)
PROT SERPL-MCNC: 7.3 G/DL (ref 6–8.2)
RBC # BLD AUTO: 4.77 M/UL (ref 4.2–5.4)
SODIUM SERPL-SCNC: 140 MMOL/L (ref 135–145)
TRIGL SERPL-MCNC: 95 MG/DL (ref 0–149)
TSH SERPL DL<=0.005 MIU/L-ACNC: 1.75 UIU/ML (ref 0.38–5.33)
WBC # BLD AUTO: 4.9 K/UL (ref 4.8–10.8)

## 2020-10-07 PROCEDURE — 84443 ASSAY THYROID STIM HORMONE: CPT

## 2020-10-07 PROCEDURE — 80061 LIPID PANEL: CPT

## 2020-10-07 PROCEDURE — 80053 COMPREHEN METABOLIC PANEL: CPT

## 2020-10-07 PROCEDURE — 82306 VITAMIN D 25 HYDROXY: CPT

## 2020-10-07 PROCEDURE — 73560 X-RAY EXAM OF KNEE 1 OR 2: CPT | Mod: RT

## 2020-10-07 PROCEDURE — 73620 X-RAY EXAM OF FOOT: CPT | Mod: LT

## 2020-10-07 PROCEDURE — 85025 COMPLETE CBC W/AUTO DIFF WBC: CPT

## 2020-10-07 PROCEDURE — 73560 X-RAY EXAM OF KNEE 1 OR 2: CPT | Mod: LT

## 2020-10-07 PROCEDURE — 36415 COLL VENOUS BLD VENIPUNCTURE: CPT

## 2020-10-07 NOTE — TELEPHONE ENCOUNTER
----- Message from Zoey Garcia P.A.-C. sent at 10/7/2020 11:51 AM PDT -----  Please call patient about their results.     Results showed: Small bone spur otherwise normal.    Thank you,    Zoey Garcia PA-C

## 2020-10-08 ENCOUNTER — HOSPITAL ENCOUNTER (OUTPATIENT)
Dept: RADIOLOGY | Facility: MEDICAL CENTER | Age: 55
End: 2020-10-08
Attending: PHYSICIAN ASSISTANT
Payer: COMMERCIAL

## 2020-10-08 DIAGNOSIS — R92.8 ABNORMAL MAMMOGRAM: ICD-10-CM

## 2020-10-08 DIAGNOSIS — R79.89 ELEVATED LFTS: ICD-10-CM

## 2020-10-08 DIAGNOSIS — R92.8 ABNORMAL ULTRASOUND OF BREAST: ICD-10-CM

## 2020-10-08 DIAGNOSIS — E55.9 VITAMIN D DEFICIENCY: ICD-10-CM

## 2020-10-08 DIAGNOSIS — E78.5 DYSLIPIDEMIA: ICD-10-CM

## 2020-10-08 LAB — 25(OH)D3 SERPL-MCNC: 27 NG/ML (ref 30–100)

## 2020-10-08 PROCEDURE — 76642 ULTRASOUND BREAST LIMITED: CPT | Mod: RT

## 2020-10-08 NOTE — PROGRESS NOTES
Right breast ultrasound October 8, 2020:    IMPRESSION:     1.  Small benign-appearing mass which has the appearance of a complicated cyst in the right breast at the 9:00 position 1 cm from the nipple accounting for the mammographic abnormality.     2.  Ultrasound-guided biopsy is recommended as discussed above.    We will order ultrasound-guided biopsy per patient.

## 2020-10-09 ENCOUNTER — TELEPHONE (OUTPATIENT)
Dept: MEDICAL GROUP | Facility: PHYSICIAN GROUP | Age: 55
End: 2020-10-09

## 2020-10-09 NOTE — TELEPHONE ENCOUNTER
----- Message from Zoey Garcia P.A.-C. sent at 10/8/2020 12:00 PM PDT -----  Please call patient about their results.     Results showed: Ultrasound showed a small appearing complicated cyst in the right breast that requires a ultrasound-guided biopsy.  I placed an order for this and you should receive a call shortly to get this scheduled.    If you have any questions or concerns, do not hesitate to contact me or my Medical Assistant. Thank you for your time today.     Respectfully,     Zoey Garcia PA-C

## 2020-10-09 NOTE — TELEPHONE ENCOUNTER
----- Message from Zoey Garcia P.A.-C. sent at 10/8/2020 11:53 AM PDT -----  Please call patient about their results.     Results showed: Your cholesterol is elevated. I recommend decreasing your intake of saturated fats which are found in meats that come from a cow or pig. Saturated fats are also found in creams, cheeses, butter, mayonnaise, and fried foods. I recommend that you try to eat more vegetables, fruits, fish, and healthy oils like olive oil. Increase fiber intake to 35 grams or more a day. If you do not eat a lot of fiber currently, increase fiber slowly over weeks to reduce bloating and abdominal discomfort. I also recommend moderate intensity exercise like a brisk walk. This exercise should last at least 30 minutes and occur 5 or more days per week.       Vitamin D is low.  Please start supplementing vitamin D 2000 IUs daily.    1 your liver enzymes is slightly elevated, not overly concerned with this, I do recommend that we repeat it in the next 3 months.  This could improve as we work towards her weight loss regimen.  If you are taking any Tylenol or drink alcohol on a routine basis I would recommend cutting back on this as well.    If you have any questions or concerns, do not hesitate to contact me or my Medical Assistant. Thank you for your time today.     Respectfully,     Zoey Garcia PA-C

## 2020-10-12 ENCOUNTER — HOSPITAL ENCOUNTER (OUTPATIENT)
Dept: RADIOLOGY | Facility: MEDICAL CENTER | Age: 55
End: 2020-10-12
Attending: PHYSICIAN ASSISTANT
Payer: COMMERCIAL

## 2020-10-12 DIAGNOSIS — R92.8 ABNORMAL FINDING ON BREAST IMAGING: ICD-10-CM

## 2020-10-12 PROCEDURE — 76942 ECHO GUIDE FOR BIOPSY: CPT

## 2020-10-12 PROCEDURE — 19285 PERQ DEV BREAST 1ST US IMAG: CPT

## 2020-10-12 PROCEDURE — 19083 BX BREAST 1ST LESION US IMAG: CPT | Mod: RT

## 2020-10-12 PROCEDURE — 77065 DX MAMMO INCL CAD UNI: CPT

## 2020-10-13 ENCOUNTER — TELEPHONE (OUTPATIENT)
Dept: MEDICAL GROUP | Facility: PHYSICIAN GROUP | Age: 55
End: 2020-10-13

## 2020-10-13 NOTE — TELEPHONE ENCOUNTER
----- Message from Zoey Garcia P.A.-C. sent at 10/13/2020  9:47 AM PDT -----  Please call patient about their results.     Results showed: Cyst decreased in size following ultrasound-guided aspiration.  This supports a benign finding.  Follow-up mammogram and ultrasound in 6 months is recommended.    Thank you,    Zoey Garcia PA-C

## 2020-11-04 ENCOUNTER — NON-PROVIDER VISIT (OUTPATIENT)
Dept: HEALTH INFORMATION MANAGEMENT | Facility: MEDICAL CENTER | Age: 55
End: 2020-11-04
Payer: COMMERCIAL

## 2020-11-04 VITALS
HEIGHT: 68 IN | OXYGEN SATURATION: 93 % | WEIGHT: 285.6 LBS | SYSTOLIC BLOOD PRESSURE: 128 MMHG | HEART RATE: 89 BPM | DIASTOLIC BLOOD PRESSURE: 82 MMHG | BODY MASS INDEX: 43.29 KG/M2

## 2020-11-04 DIAGNOSIS — F33.2 MAJOR DEPRESSIVE DISORDER, RECURRENT SEVERE WITHOUT PSYCHOTIC FEATURES (HCC): ICD-10-CM

## 2020-11-04 DIAGNOSIS — F50.89 OTHER DISORDER OF EATING: ICD-10-CM

## 2020-11-04 DIAGNOSIS — R63.5 WEIGHT GAIN: ICD-10-CM

## 2020-11-04 DIAGNOSIS — R53.83 LACK OF ENERGY: ICD-10-CM

## 2020-11-04 DIAGNOSIS — E66.01 MORBID OBESITY WITH BMI OF 40.0-44.9, ADULT (HCC): ICD-10-CM

## 2020-11-04 DIAGNOSIS — G47.09 OTHER INSOMNIA: ICD-10-CM

## 2020-11-04 DIAGNOSIS — F41.9 ANXIETY: ICD-10-CM

## 2020-11-04 DIAGNOSIS — Z81.8: ICD-10-CM

## 2020-11-04 DIAGNOSIS — Z91.419 HISTORY OF ABUSE IN ADULTHOOD: ICD-10-CM

## 2020-11-04 DIAGNOSIS — E55.9 VITAMIN D INSUFFICIENCY: ICD-10-CM

## 2020-11-04 DIAGNOSIS — E66.01 CLASS 3 SEVERE OBESITY WITH BODY MASS INDEX (BMI) OF 40.0 TO 44.9 IN ADULT, UNSPECIFIED OBESITY TYPE, UNSPECIFIED WHETHER SERIOUS COMORBIDITY PRESENT (HCC): ICD-10-CM

## 2020-11-04 DIAGNOSIS — E78.2 MIXED HYPERLIPIDEMIA: ICD-10-CM

## 2020-11-04 PROBLEM — F50.9 EATING DISORDER: Status: ACTIVE | Noted: 2020-11-04

## 2020-11-04 PROCEDURE — 99205 OFFICE O/P NEW HI 60 MIN: CPT | Performed by: INTERNAL MEDICINE

## 2020-11-04 PROCEDURE — 97802 MEDICAL NUTRITION INDIV IN: CPT | Performed by: DIETITIAN, REGISTERED

## 2020-11-04 PROCEDURE — 93000 ELECTROCARDIOGRAM COMPLETE: CPT | Performed by: INTERNAL MEDICINE

## 2020-11-04 RX ORDER — MULTIVIT-MIN/IRON/FOLIC ACID/K 18-600-40
2000 CAPSULE ORAL DAILY
Qty: 30 TAB | Refills: 1 | COMMUNITY

## 2020-11-04 ASSESSMENT — PATIENT HEALTH QUESTIONNAIRE - PHQ9
CLINICAL INTERPRETATION OF PHQ2 SCORE: 3
SUM OF ALL RESPONSES TO PHQ QUESTIONS 1-9: 10
5. POOR APPETITE OR OVEREATING: 2 - MORE THAN HALF THE DAYS

## 2020-11-04 ASSESSMENT — FIBROSIS 4 INDEX: FIB4 SCORE: 0.76
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
## 2020-11-25 ENCOUNTER — TELEMEDICINE (OUTPATIENT)
Dept: HEALTH INFORMATION MANAGEMENT | Facility: MEDICAL CENTER | Age: 55
End: 2020-11-25
Payer: COMMERCIAL

## 2020-11-25 DIAGNOSIS — F41.9 ANXIETY: ICD-10-CM

## 2020-11-25 DIAGNOSIS — R63.5 WEIGHT GAIN: ICD-10-CM

## 2020-11-25 DIAGNOSIS — E78.2 MIXED HYPERLIPIDEMIA: ICD-10-CM

## 2020-11-25 DIAGNOSIS — F50.89 OTHER DISORDER OF EATING: ICD-10-CM

## 2020-11-25 DIAGNOSIS — E66.01 CLASS 3 SEVERE OBESITY WITH BODY MASS INDEX (BMI) OF 40.0 TO 44.9 IN ADULT, UNSPECIFIED OBESITY TYPE, UNSPECIFIED WHETHER SERIOUS COMORBIDITY PRESENT (HCC): ICD-10-CM

## 2020-11-25 DIAGNOSIS — E66.01 MORBID OBESITY WITH BMI OF 40.0-44.9, ADULT (HCC): ICD-10-CM

## 2020-11-25 DIAGNOSIS — E55.9 VITAMIN D INSUFFICIENCY: ICD-10-CM

## 2020-11-25 PROCEDURE — 97803 MED NUTRITION INDIV SUBSEQ: CPT | Mod: 95,CR | Performed by: DIETITIAN, REGISTERED

## 2020-11-25 PROCEDURE — 99214 OFFICE O/P EST MOD 30 MIN: CPT | Mod: 95,CR | Performed by: INTERNAL MEDICINE

## 2020-11-25 NOTE — PROGRESS NOTES
This evaluation was conducted via Zoom using secure and encrypted videoconferencing technology due to COVID19. The patient was in a private location in the state of [place].    The patient's identity was confirmed and verbal consent was obtained for this virtual visit.    Nutrition Reassess: Medical Weight Management   Today's date: 11/25/2020  Referring Provider: No ref. provider found      Time: in/out 1:30-1:55  Visit#: 2    Subjective:  - has been trying to eat better  - has food in her home that is not as healthy  - states better at not snacking as much  - bought greek yogurt  - has been more mindful of what she is eating  - is not tracking   - would like to start walking   - feels ND bars help stave off hunger  - feels she has to follow a certain routine from 9-3:30  - has been WFH   - finds more boredom and grabbing snacks  - often skips dinner or eats something small  - has been drinking more soda at home  - does not like tap water    Diet history:   Breakfast - greek yogurt with granola (tbsp or so)  Snack - rice pudding   Lunch - cottage cheese with banana  Snack - balance break snack pack  Dinner - grilled cheese with garlic fries    Anthropometrics/Objective  No vitals taken at this virtual visit as no access to scale.  Starting weight/date 295.6 lbs     Total weight change : NAlb            Meal Plan:   Calorie controlled, high protein, low carb diet    with 1-2 meal replacements per day    ReAssesment/Notes:    Chioma has been working on her health goals towards weight loss. She finds that she was wanting to finish the foods in her home but is being more mindful of what she has she states. She is not tracking via written tracking or via an danitza though we reviewed her intake. Unsure of patients' readiness to change as her going through intake she presented with different barriers that she has. We discussed however her intake and how she can balance her plate. What she would change if anything to it.  Discussed adding NS veggies to her normal routine and water goals as well as she admits she has been having more soda.  At next visit suggest reviewing nutrition basics or food journal if it is available.     Goals:  1. Add at least 1 servings of NS veggies a day at snacks or meals  2. Lets get water up and soda down!     Follow-up: 4-6 weeks

## 2020-11-25 NOTE — PROGRESS NOTES
This evaluation was conducted via Zoom using secure and encrypted videoconferencing technology. The patient was in a private location in the state of [place].    The patient's identity was confirmed and verbal consent was obtained for this virtual visit.      Bariatric Medicine Follow Up  Chief Complaint   Patient presents with   • Weight Gain       History of Present Illness:   Chioma Mock is a 55 y.o. female who presents for follow-up to intensive behavioral modification of overweight and to help address co-morbidities caused by overweight, as below.    Obesity Stage/Class: 2/3  During the patient's last visit, the following were discussed and recommended:  Weight Goal: Under 200 lb to start  Dietary intervention:     Wants grab and go meals, does not like cooking  MR: 1-2 ND daily instead of [employer]  High Protein/Low Carb whole food meals and 2 snacks between meals daily  >100 g protein, <100 g total carbs daily, under 1600 kcal/d initial goal  Track daily intake with My Fitness Pal, bring to next visit  64+ oz water per day  Avoid excessive grazing on meals  Physical Activity:  Start walking, set goals  Does not want to resume gym yet  Labs:  n/a  Diagnostics:  EKG  Rx changes:   Consider AOM  Pt to d/w psychiatrist about reducing some psychotropics she is on, as most are wt inducing  Behavior change:   Seeing therapist 2/wk and psychiatrist monthly  Surgical considerations: Pt declines referral for bariatric surgery    ___    Challenges:  No scale, hard to monitor  Dietary adherence:  Better  Ate up a lot of processed foods at home before starting program since last visit  Eating more yogurt Greek  Ran out of ND MR, using PP instead 1/d  Tried tracking with MFP, not consistent  Reducing chocolate  Exercise:  None    AntiObesity Medication use: none  No change to psychotropics  Medication efficacy/tolerance/side effects: n/a  Medication compliance: n/a    Status of comorbid conditions/other medical  diagnoses:  Anxiety: Controlled on BuSpar, bupropion, Provigil  Sees psych regularly monthly  HLD: Uncontrolled, not on statin or fenofibrate  VDI: Controlled with daily vitamin D supplement       Review of Systems   Pt denies lightheadedness, weakness, worsening fatigue.  All other ROS were reviewed and are otherwise unchanged from my previous visit with patient.    Physical Exam:    There were no vitals taken for this visit.      Last weight 285 pounds  Current wt  No scale at home  Weight change since last visit:  ? lb     Constitutional: Oriented to person, place, and time and well-developed, well-nourished, and in no distress.    Head: Normocephalic.   Skin: Not diaphoretic.   Psychiatric: Mood, memory, affect and judgment normal.     Laboratory:   Recent labs reviewed.      Dietitian Assessment: I have reviewed the Dietitian's assessment related to this encounter.     ASSESSMENT  55 y.o.  female presents for intensive lifestyle intervention for treatment of obesity and co-morbid conditions.  Obesity Stage ([place])/Class: 1/3    1. Weight gain     2. Other disorder of eating     3. Anxiety     4. Mixed hyperlipidemia     5. Vitamin D insufficiency     6. Morbid obesity with BMI of 40.0-44.9, adult (HCC)         The patient still struggles with uncontrolled class III obesity.  She has not had weight gain this month most likely, but has not lost.  Difficult to tell given she has no scale at home.  Recommend tracking intake even if not daily, use of stimulus narrowing with meal replacements more often.  Recommend increasing her activity level.  Recommend possible change to psychotropic regimen per psychiatry if less weight inducing medications are available they keep her mood stable.  Continue to monitor anxiety, lipids, vitamin D as she progresses through the program.    PLAN  Weight Goal:  <200 lb  Dietary intervention:    MR:  1 PP daily, ok to use 2 daily  She like ND bar, wants to resume  High Protein/Low  Carb whole food meals and 2 snacks between meals daily  >100 g protein, <100 g total carbs daily, under 200 kcal/d   25 g CHO per meal best, will allow for snack, d/w RD further  Track daily intake with My Fitness Pal, bring to next visit  64+ oz water per day  Avoid Starbucks sweet drinks  Physical Activity:  Set walking goals this month, 20 min daily outside  Fearful of gym  Labs:  n/a  Rx changes:   none  Consider metformin next visit  Pt to discuss with psychiatrist whether any meds can be changed that may be wt inducing psychotropics  Behavior modification:   Be consistent with above  Surgical considerations:  n/a  Follow-up: one month with MD    Patient's body mass index is unknown because there is no height or weight on file. Exercise and nutrition counseling were performed at this visit.

## 2021-04-28 ENCOUNTER — PATIENT MESSAGE (OUTPATIENT)
Dept: MEDICAL GROUP | Facility: PHYSICIAN GROUP | Age: 56
End: 2021-04-28

## 2021-04-28 DIAGNOSIS — R92.8 ABNORMAL FINDING ON BREAST IMAGING: ICD-10-CM

## 2021-04-28 NOTE — PROGRESS NOTES
Previously had ultrasound-guided right breast cyst aspiration that showed decrease in size and mammographic finding suggesting ultrasound finding corresponds with mammographic finding.  6-month follow-up ultrasound recommended.

## 2021-05-10 ENCOUNTER — OFFICE VISIT (OUTPATIENT)
Dept: MEDICAL GROUP | Facility: PHYSICIAN GROUP | Age: 56
End: 2021-05-10
Payer: COMMERCIAL

## 2021-05-10 VITALS
TEMPERATURE: 97 F | WEIGHT: 285 LBS | RESPIRATION RATE: 12 BRPM | HEIGHT: 68 IN | SYSTOLIC BLOOD PRESSURE: 122 MMHG | HEART RATE: 74 BPM | DIASTOLIC BLOOD PRESSURE: 76 MMHG | BODY MASS INDEX: 43.19 KG/M2 | OXYGEN SATURATION: 96 %

## 2021-05-10 DIAGNOSIS — M79.604 PAIN IN BOTH LOWER EXTREMITIES: ICD-10-CM

## 2021-05-10 DIAGNOSIS — M79.605 PAIN IN BOTH LOWER EXTREMITIES: ICD-10-CM

## 2021-05-10 DIAGNOSIS — M79.672 LEFT FOOT PAIN: ICD-10-CM

## 2021-05-10 PROCEDURE — 99213 OFFICE O/P EST LOW 20 MIN: CPT | Performed by: PHYSICIAN ASSISTANT

## 2021-05-10 ASSESSMENT — FIBROSIS 4 INDEX: FIB4 SCORE: 0.77

## 2021-05-10 NOTE — ASSESSMENT & PLAN NOTE
Continues to have chronic left foot pain. Discussed referral today.   Xray:   IMPRESSION:     1.  No acute findings or significant arthropathy identified.     2.  Small calcaneal bone spur.

## 2021-05-10 NOTE — ASSESSMENT & PLAN NOTE
For the last 3-4 months she complains of aching pain over her thighs. Worse when she lays down long periods of time and hurts when she stands up. Better with movement. No paresthesias. Takes Advil for it as needed. Unsure it works for her. No back pain. The past month she's been trying to be more active to see if this helps. Has not worsened her thigh pain. No hip pain. No leg weakness. No injuries.
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
## 2021-05-14 ENCOUNTER — HOSPITAL ENCOUNTER (OUTPATIENT)
Dept: RADIOLOGY | Facility: MEDICAL CENTER | Age: 56
End: 2021-05-14
Attending: PHYSICIAN ASSISTANT
Payer: COMMERCIAL

## 2021-05-14 DIAGNOSIS — R92.8 ABNORMAL MAMMOGRAM: ICD-10-CM

## 2021-05-14 PROCEDURE — 76642 ULTRASOUND BREAST LIMITED: CPT | Mod: RT

## 2021-05-14 PROCEDURE — G0279 TOMOSYNTHESIS, MAMMO: HCPCS | Mod: RT

## 2021-05-17 ENCOUNTER — TELEPHONE (OUTPATIENT)
Dept: MEDICAL GROUP | Facility: PHYSICIAN GROUP | Age: 56
End: 2021-05-17

## 2021-07-08 ENCOUNTER — PHYSICAL THERAPY (OUTPATIENT)
Dept: PHYSICAL THERAPY | Facility: REHABILITATION | Age: 56
End: 2021-07-08
Attending: PHYSICIAN ASSISTANT
Payer: COMMERCIAL

## 2021-07-08 DIAGNOSIS — M79.604 PAIN IN BOTH LOWER EXTREMITIES: ICD-10-CM

## 2021-07-08 DIAGNOSIS — M79.605 PAIN IN BOTH LOWER EXTREMITIES: ICD-10-CM

## 2021-07-08 PROCEDURE — 97161 PT EVAL LOW COMPLEX 20 MIN: CPT

## 2021-07-08 PROCEDURE — 97110 THERAPEUTIC EXERCISES: CPT

## 2021-07-08 ASSESSMENT — ENCOUNTER SYMPTOMS
PAIN SCALE AT HIGHEST: 5
PAIN SCALE AT LOWEST: 0
PAIN SCALE: 0

## 2021-07-08 NOTE — OP THERAPY EVALUATION
Outpatient Physical Therapy  INITIAL EVALUATION    [facility] [place]  [address], [address]  [place] [address]  Phone:  [phone]  Fax:  [phone]    Date of Evaluation: 2021    Patient: Chioma Mock  YOB: 1965  MRN: [id number]     Referring Provider: Zoey Garcia P.A.-C.  [address] [place] [address]   Referring Diagnosis Pain in both lower extremities [M79.604, M79.605]     Time Calculation  Start time: 0130  Stop time: 0215 Time Calculation (min): 45 minutes         Chief Complaint: thigh pain  Visit Diagnoses     ICD-10-CM   1. Pain in both lower extremities  M79.604    M79.605       Date of onset of impairment: 10/1/20    Subjective:   History of Present Illness:     Mechanism of injury:  Chioma Mock is a 55 y.o. female that presents to therapy with bilateral thigh pain since about October of last year. She states that symptoms came on with insidious onset. Her pain is located along her anterior thighs.  Patient denies fevers/chills, numbness/weakness of lower extremities, bowel/bladder incontinence, saddle anesthesia.     Aggravating factors: carrying and going upstairs. Getting up from lower surfaces  Relieving factors: avoiding lower chairs. Advil (rare use)    ADL limitations: pain with getting out of a chair    Pain:     Current pain ratin    At best pain ratin    At worst pain ratin      Past Medical History:   Diagnosis Date   • Adjustment reaction with anxiety and depression 2009   • Benign Neoplasm of Muscle left leg. 2009   • Depressive disorder, not elsewhere classified 2009   • Dermatitis 2011   • History of epilepsy 10/19/2010   • History of nephrolithiasis 2014   • Insomnia 2009   • Juvenile myoclonic epilepsy (HCC)     Medicine until age 14   • Major depressive disorder, recurrent episode, moderate (HCC) 2011   • Major depressive disorder,  recurrent severe without psychotic features (HCC) 8/22/2009   • Neurodermatitis 9/28/2011   • Obesity 11/30/2010   • Preventative health care 8/22/2009   • Sleep related leg cramps 11/30/2010     Past Surgical History:   Procedure Laterality Date   • TUMOR EXCISION WITH BIOPSY  2006    benign tumor left lower     Social History     Tobacco Use   • Smoking status: Never Smoker   • Smokeless tobacco: Never Used   Substance Use Topics   • Alcohol use: Yes     Alcohol/week: 0.0 oz     Comment: rarely     Family and Occupational History     Socioeconomic History   • Marital status: Legally      Spouse name: Not on file   • Number of children: Not on file   • Years of education: Not on file   • Highest education level: Not on file   Occupational History   • Occupation: teacher       Objective     Lumbar Screen  Lumbar range of motion within normal limits.    Neurological Testing     Sensation     Hip   Left Hip   Intact: light touch    Right Hip   Intact: light touch    Active Range of Motion   Left Hip   Normal active range of motion    Right Hip   Normal active range of motion    Passive Range of Motion   Left Hip   Normal passive range of motion    Right Hip   Normal passive range of motion    Strength:      Left Hip   Planes of Motion   Flexion: 4  Extension: 4+  Abduction: 4+  Adduction: 5  External rotation: 5  Internal rotation: 4+    Right Hip   Planes of Motion   Flexion: 4  Extension: 4+  Abduction: 4+  Adduction: 5  External rotation: 5  Internal rotation: 4+        Therapeutic Exercises (CPT 87049):       Therapeutic Exercise Summary: HEP instruction/performance and development. Handout provided and exercises located below:  Access Code: [email or web address]  URL: [email or web address]  Date: 07/08/2021  Prepared by: Pedro Riggins    Exercises        Sit to Stand without Arm Support - 2 x daily - 5 reps      Supine Gluteal Sets - 2 x daily - 10 reps - 5 hold      Active Straight Leg Raise with Quad Set - 2 x  daily - 2 sets - 4 reps      Time-based treatments/modalities:    Physical Therapy Timed Treatment Charges  Therapeutic exercise minutes (CPT 35489): 10 minutes      Assessment, Response and Plan:   Assessment details:  Chioma Mock is a 55 y.o. female with signs and symptoms consistent with hip flexor vs quadriceps weakness secondary deconditioning vs excessive weight. She requires skilled physical therapy intervention to decrease pain, increase  Strength, increase functional mobility, improve ADL completion and establish a home exercise program.    Plan:   Planned therapy interventions:  Therapeutic Exercise (CPT 12100), Manual Therapy (CPT 69811), Neuromuscular Re-education (CPT 96574) and E Stim Unattended (CPT 85024)  Frequency: 1-2x/week.  Duration in weeks:  6  Discussed with:  Patient    Functional Assessment Used  PT Functional Assessment Tool Used: LEFS  PT Functional Assessment Score: 42/80     Referring provider co-signature:  I have reviewed this plan of care and my co-signature certifies the need for services.    Certification Period: 07/08/2021 to  08/19/21    Physician Signature: ________________________________ Date: ______________

## 2021-07-12 NOTE — OP THERAPY DAILY TREATMENT
Outpatient Physical Therapy  DAILY TREATMENT     [facility] [place]  [address], [address]  [facility] [address]  Phone:  [phone]  Fax:  [phone]    Date: 07/13/2021    Patient: Chioma Mock  YOB: 1965  MRN: [id number]     Time Calculation    Start time: 1115  Stop time: 1155 Time Calculation (min): 40 minutes         Chief Complaint: thigh/ quad problem  Visit #: 2    SUBJECTIVE:  Pt reports not doing the exercises as much as she should have but notes that she has been more active with cleaning her closet out at home.     OBJECTIVE:  Current objective measures:           Therapeutic Exercises (CPT 55918):     1. Nustep, lvl 2 x 8min    2. Shuttle , DL 4c x 40, 5c x 25,     3. SAQ, 3# AW alternating x 35 each    4. Sit to stand, x 10, 56seconds    5. Standing ankle rocker, 2min    6. Standing tandem balance, with knees slightly flexed x 2min each position with airex pad 2min each position    7. SLR x, 5 each      Therapeutic Exercise Summary: HEP instruction/performance and development. Handout provided and exercises located below:  Access Code: [email or web address]  URL: [email or web address]  Date: 07/08/2021  Prepared by: Pedro Riggins    Exercises        Sit to Stand without Arm Support - 2 x daily - 5 reps      Supine Gluteal Sets - 2 x daily - 10 reps - 5 hold      Active Straight Leg Raise with Quad Set - 2 x daily - 2 sets - 4 reps      Time-based treatments/modalities:    Physical Therapy Timed Treatment Charges  Therapeutic exercise minutes (CPT 61224): 40 minutes      Pain rating (1-10) before treatment:  0  Pain rating (1-10) after treatment:  0    ASSESSMENT:   Response to treatment: Overall fatigue and endurance most prevalent with today's exercises/ treatment. Pt to generally progress with improved quad strength/ hip extension strength as able. F/u Thursday.     PLAN/RECOMMENDATIONS:   Plan for treatment: therapy treatment to continue next  visit.  Planned interventions for next visit: continue with current treatment.

## 2021-07-13 ENCOUNTER — PHYSICAL THERAPY (OUTPATIENT)
Dept: PHYSICAL THERAPY | Facility: REHABILITATION | Age: 56
End: 2021-07-13
Attending: PHYSICIAN ASSISTANT
Payer: COMMERCIAL

## 2021-07-13 DIAGNOSIS — M79.605 PAIN IN BOTH LOWER EXTREMITIES: ICD-10-CM

## 2021-07-13 DIAGNOSIS — M79.604 PAIN IN BOTH LOWER EXTREMITIES: ICD-10-CM

## 2021-07-13 PROCEDURE — 97110 THERAPEUTIC EXERCISES: CPT

## 2021-07-15 ENCOUNTER — PHYSICAL THERAPY (OUTPATIENT)
Dept: PHYSICAL THERAPY | Facility: REHABILITATION | Age: 56
End: 2021-07-15
Attending: PHYSICIAN ASSISTANT
Payer: COMMERCIAL

## 2021-07-15 DIAGNOSIS — M79.604 PAIN IN BOTH LOWER EXTREMITIES: ICD-10-CM

## 2021-07-15 DIAGNOSIS — M79.605 PAIN IN BOTH LOWER EXTREMITIES: ICD-10-CM

## 2021-07-15 PROCEDURE — 97110 THERAPEUTIC EXERCISES: CPT

## 2021-07-15 NOTE — OP THERAPY DAILY TREATMENT
Outpatient Physical Therapy  DAILY TREATMENT     [facility] [place]  [address], [address]  [facility] [facility] [address]  Phone:  [phone]  Fax:  [phone]    Date: 07/15/2021    Patient: Chioma Mock  YOB: 1965  MRN: [id number]     Time Calculation    Start time: 0815  Stop time: 0900 Time Calculation (min): 45 minutes         Chief Complaint: thigh/ quad problem  Visit #: 3    SUBJECTIVE:  Pt reports not doing the exercises as much as she should have but notes that she has been more active with cleaning her closet out at home.     OBJECTIVE:  Current objective measures:           Therapeutic Exercises (CPT 10214):     1. Nustep, lvl 3 x 6min    2. Shuttle , DL 5c x30, 5c x 25,, SL 4c x 15 each     3. SAQ, 4# AW alternating x 35 each    4. Sit to stand, x 10, 56seconds    5. Standing ankle rocker, 2min    6. Standing tandem balance, with knees slightly flexed x 2min each position with airex pad 2min each position    7. SLR x, 5 each, NT    8. Calf raise, Shuttle 3c x 20,     9. Hip abduction slider , orange band x 15 each, 0# x 15 each      Therapeutic Exercise Summary: HEP instruction/performance and development. Handout provided and exercises located below:  Access Code: [email or web address]  URL: [email or web address]  Date: 07/15/2021  Prepared by: Pedro Riggins    Exercises        Sit to Stand without Arm Support - 2 x daily - 5 reps      Active Straight Leg Raise with Quad Set - 2 x daily - 2 sets - 4 reps      Beginner Bridge - 1 x daily - 5-10 reps      Tandem Stance with Support - 2 x daily - 2 sets - 30 hold      Time-based treatments/modalities:    Physical Therapy Timed Treatment Charges  Therapeutic exercise minutes (CPT 77497): 45 minutes      Pain rating (1-10) before treatment:  0  Pain rating (1-10) after treatment:  0    ASSESSMENT:   Response to treatment: pt limited by quad/ hip extensor fatigue and weakness. Overall tolerating strengthening without  increased pain. F/u Tuesday. Continue to progress LE strength.      PLAN/RECOMMENDATIONS:   Plan for treatment: therapy treatment to continue next visit.  Planned interventions for next visit: continue with current treatment.

## 2021-07-20 ENCOUNTER — PHYSICAL THERAPY (OUTPATIENT)
Dept: PHYSICAL THERAPY | Facility: REHABILITATION | Age: 56
End: 2021-07-20
Attending: PHYSICIAN ASSISTANT
Payer: COMMERCIAL

## 2021-07-20 DIAGNOSIS — M79.604 PAIN IN BOTH LOWER EXTREMITIES: ICD-10-CM

## 2021-07-20 DIAGNOSIS — M79.605 PAIN IN BOTH LOWER EXTREMITIES: ICD-10-CM

## 2021-07-20 PROCEDURE — 97110 THERAPEUTIC EXERCISES: CPT

## 2021-07-20 NOTE — OP THERAPY DAILY TREATMENT
Outpatient Physical Therapy  DAILY TREATMENT     [facility] [place]  [address]  [facility] [address]  Phone:  [phone]  Fax:  [phone]    Date: 07/20/2021    Patient: Chioma Mock  YOB: 1965  MRN: [id number]     Time Calculation    Start time: 1120  Stop time: 1205 Time Calculation (min): 45 minutes         Chief Complaint: thigh/ quad problem  Visit #: 4    SUBJECTIVE:  Reports knee soreness/ pain after last visit but has been diminished improving. Reports compliance with the exercises at times.       OBJECTIVE:  Current objective measures:           Therapeutic Exercises (CPT 89104):     1. Nustep, lvl 3 x 8min, lvl 4 x 2min    2. Shuttle , DL 6c x20, SL 5c x 20 ea    3. SAQ, 4# AW alternating x 35 each    4. Sit to stand, x 10, 56seconds    5. Standing ankle rocker, 2min    6. Standing tandem balance, with knees slightly flexed x 2min each position with airex pad 2min each position, NT    7. TRX squat, to chair x 15    8. Calf raise, Shuttle 3c x 20,     9. Hip abduction slider , orange band x 15 each, 0# x 15 each      Therapeutic Exercise Summary: HEP instruction/performance and development. Handout provided and exercises located below:  Access Code: [email or web address]  URL: [email or web address]  Date: 07/15/2021  Prepared by: Pedro Riggins    Exercises        Sit to Stand without Arm Support - 2 x daily - 5 reps      Active Straight Leg Raise with Quad Set - 2 x daily - 2 sets - 4 reps      Beginner Bridge - 1 x daily - 5-10 reps      Tandem Stance with Support - 2 x daily - 2 sets - 30 hold      Time-based treatments/modalities:    Physical Therapy Timed Treatment Charges  Therapeutic exercise minutes (CPT 00071): 45 minutes      Pain rating (1-10) before treatment:  0  Pain rating (1-10) after treatment:  0    ASSESSMENT:   Response to treatment: pt limited by quad/ hip extensor fatigue and weakness. Overall tolerating strengthening without  increased pain. F/u Thursday. Continue to progress LE strength.      PLAN/RECOMMENDATIONS:   Plan for treatment: therapy treatment to continue next visit.  Planned interventions for next visit: continue with current treatment.

## 2021-07-22 ENCOUNTER — PHYSICAL THERAPY (OUTPATIENT)
Dept: PHYSICAL THERAPY | Facility: REHABILITATION | Age: 56
End: 2021-07-22
Attending: PHYSICIAN ASSISTANT
Payer: COMMERCIAL

## 2021-07-22 DIAGNOSIS — M79.604 PAIN IN BOTH LOWER EXTREMITIES: ICD-10-CM

## 2021-07-22 DIAGNOSIS — M79.605 PAIN IN BOTH LOWER EXTREMITIES: ICD-10-CM

## 2021-07-22 PROCEDURE — 97110 THERAPEUTIC EXERCISES: CPT

## 2021-07-22 NOTE — OP THERAPY DAILY TREATMENT
Outpatient Physical Therapy  DAILY TREATMENT     [facility] [place]  [address], [address]  [facility] [address]  Phone:  [phone]  Fax:  [phone]    Date: 07/22/2021    Patient: Chioma Mock  YOB: 1965  MRN: [id number]     Time Calculation    Start time: 1035  Stop time: 1115 Time Calculation (min): 40 minutes         Chief Complaint: thigh/ quad problem  Visit #: 5    SUBJECTIVE:  Overall doing well and did not notice thigh pain when walking down the stairs today. Has a busy day ahead of her.     OBJECTIVE:  Current objective measures: hip extension 4+/5, knee extension 4+/5          Therapeutic Exercises (CPT 23407):     1. Nustep, lvl 4 x 9min     2. Shuttle , DL 8c x40, 7c x 25,     3. SAQ, 5# 20 each alternating    4. Sit to stand, x 10, 56seconds, NT    5. Standing ankle rocker, 2min, NT    6. Standing tandem balance, with knees slightly flexed x 2min each position with airex pad 2min each position, NT    7. TRX squat, to chair x 15    8. Calf raise, Shuttle 3c x 20,     9. Hip abduction slider , orange band x 15 each, 0# x 15 each      Therapeutic Exercise Summary: HEP instruction/performance and development. Handout provided and exercises located below:  Access Code: [email or web address]  URL: [email or web address]  Date: 07/15/2021  Prepared by: Pedro Riggins    Exercises        Sit to Stand without Arm Support - 2 x daily - 5 reps      Active Straight Leg Raise with Quad Set - 2 x daily - 2 sets - 4 reps      Beginner Bridge - 1 x daily - 5-10 reps      Tandem Stance with Support - 2 x daily - 2 sets - 30 hold      Time-based treatments/modalities:    Physical Therapy Timed Treatment Charges  Therapeutic exercise minutes (CPT 64517): 40 minutes      Pain rating (1-10) before treatment:  0  Pain rating (1-10) after treatment:  0    ASSESSMENT:   Response to treatment: tolerating progressions well without increased pain. pt limited by quad/ hip extensor  fatigue and weakness. Overall tolerating strengthening without increased pain.  F/u next week.     PLAN/RECOMMENDATIONS:   Plan for treatment: therapy treatment to continue next visit.  Planned interventions for next visit: continue with current treatment.

## 2021-07-27 ENCOUNTER — PHYSICAL THERAPY (OUTPATIENT)
Dept: PHYSICAL THERAPY | Facility: REHABILITATION | Age: 56
End: 2021-07-27
Attending: PHYSICIAN ASSISTANT
Payer: COMMERCIAL

## 2021-07-27 DIAGNOSIS — M79.604 PAIN IN BOTH LOWER EXTREMITIES: ICD-10-CM

## 2021-07-27 DIAGNOSIS — M79.605 PAIN IN BOTH LOWER EXTREMITIES: ICD-10-CM

## 2021-07-27 PROCEDURE — 97110 THERAPEUTIC EXERCISES: CPT

## 2021-07-27 NOTE — OP THERAPY DAILY TREATMENT
Outpatient Physical Therapy  DAILY TREATMENT     [facility] [place]  [address]  [facility] [address]  Phone:  [phone]  Fax:  [phone]    Date: 07/27/2021    Patient: Choima Mock  YOB: 1965  MRN: [id number]     Time Calculation    Start time: 1120  Stop time: 1202 Time Calculation (min): 42 minutes         Chief Complaint: thigh/ quad problem  Visit #: 6    SUBJECTIVE:  Overall doing well and did not notice thigh pain when walking down the stairs today. Has a busy day ahead of her.     OBJECTIVE:  Current objective measures: hip extension 4+/5, knee extension 4+/5          Therapeutic Exercises (CPT 67495):     1. Nustep, lvl 4 x 9min     2. Shuttle , DL 8c x40,, SL 5c x 20 each     3. MAQ, heels off table, 5# 20 each alternating    4. Sit to stand, x 10, 56seconds    5. Standing ankle rocker, 2min    6. Standing tandem balance, with knees slightly flexed x 2min each position with airex pad 2min each position    7. TRX squat, to chair x 15, NT    8. Calf raise, Shuttle 3c x 20, , NT    9. Hip abduction slider , orange band x 15 each, 0# x 15 each    10. Sissy squat chair assisted, x 15    11. Step down and up, 6in retro x 15 each, 4 in forward x 10 each      Therapeutic Exercise Summary: HEP instruction/performance and development. Handout provided and exercises located below:  Access Code: [email or web address]  URL: [email or web address]  Date: 07/15/2021  Prepared by: Pedro Riggins    Exercises        Sit to Stand without Arm Support - 2 x daily - 5 reps      Active Straight Leg Raise with Quad Set - 2 x daily - 2 sets - 4 reps      Beginner Bridge - 1 x daily - 5-10 reps      Tandem Stance with Support - 2 x daily - 2 sets - 30 hold      Time-based treatments/modalities:    Physical Therapy Timed Treatment Charges  Therapeutic exercise minutes (CPT 43937): 42 minutes      Pain rating (1-10) before treatment:  0  Pain rating (1-10) after treatment:   0    ASSESSMENT:   Response to treatment: Pt to promote to independence with HEP next visit.  Overall tolerating strengthening without increased pain.  F/u Thursday.     PLAN/RECOMMENDATIONS:   Plan for treatment: therapy treatment to continue next visit.  Planned interventions for next visit: continue with current treatment.

## 2021-07-29 ENCOUNTER — PHYSICAL THERAPY (OUTPATIENT)
Dept: PHYSICAL THERAPY | Facility: REHABILITATION | Age: 56
End: 2021-07-29
Attending: PHYSICIAN ASSISTANT
Payer: COMMERCIAL

## 2021-07-29 DIAGNOSIS — M79.605 PAIN IN BOTH LOWER EXTREMITIES: ICD-10-CM

## 2021-07-29 DIAGNOSIS — M79.604 PAIN IN BOTH LOWER EXTREMITIES: ICD-10-CM

## 2021-07-29 PROCEDURE — 97110 THERAPEUTIC EXERCISES: CPT

## 2021-07-29 NOTE — OP THERAPY DISCHARGE SUMMARY
Outpatient Physical Therapy  DISCHARGE SUMMARY NOTE      [facility] [place]  [address], [address]  [place] [address]  Phone:  [phone]  Fax:  [phone]    Date of Visit: 07/29/2021    Patient: Chioma Mock  YOB: 1965  MRN: [id number]     Referring Provider: Zoey Garcia P.A.-C.  [address] [place] [address] [place] [address]   Referring Diagnosis Pain in right leg [M79.604]         Functional Assessment Used  PT Functional Assessment Tool Used: LEFS  PT Functional Assessment Score: 50/80     Your patient is being discharged from Physical Therapy with the following comments:   · Goals partially met    Comments:  Chioma Mock has completed 7 physical therapy sessions on her current prescription. She has improved function, decreased pain, improved strength, and she continues to progress with her home exercise program. Recommend to discharge patient to full independent home exercise program at this time. Thank you for the opportunity to assist you and your patient.       Limitations Remaining:  LE strength, some pain with descending stairs.     Recommendations:  Continue strengthening without increased pain. F/u with PCP as needed    Pedro Riggins, PT, DPT    Date: 7/29/2021

## 2021-08-03 ENCOUNTER — APPOINTMENT (OUTPATIENT)
Dept: PHYSICAL THERAPY | Facility: REHABILITATION | Age: 56
End: 2021-08-03
Attending: PHYSICIAN ASSISTANT
Payer: COMMERCIAL

## 2021-08-05 ENCOUNTER — APPOINTMENT (OUTPATIENT)
Dept: PHYSICAL THERAPY | Facility: REHABILITATION | Age: 56
End: 2021-08-05
Attending: PHYSICIAN ASSISTANT
Payer: COMMERCIAL

## 2021-08-10 ENCOUNTER — APPOINTMENT (OUTPATIENT)
Dept: PHYSICAL THERAPY | Facility: REHABILITATION | Age: 56
End: 2021-08-10
Attending: PHYSICIAN ASSISTANT
Payer: COMMERCIAL

## 2021-08-12 ENCOUNTER — APPOINTMENT (OUTPATIENT)
Dept: PHYSICAL THERAPY | Facility: REHABILITATION | Age: 56
End: 2021-08-12
Attending: PHYSICIAN ASSISTANT
Payer: COMMERCIAL

## 2021-08-30 ENCOUNTER — OFFICE VISIT (OUTPATIENT)
Dept: MEDICAL GROUP | Facility: PHYSICIAN GROUP | Age: 56
End: 2021-08-30
Payer: COMMERCIAL

## 2021-08-30 VITALS
TEMPERATURE: 97 F | WEIGHT: 278 LBS | BODY MASS INDEX: 42.13 KG/M2 | DIASTOLIC BLOOD PRESSURE: 68 MMHG | RESPIRATION RATE: 16 BRPM | OXYGEN SATURATION: 96 % | SYSTOLIC BLOOD PRESSURE: 118 MMHG | HEIGHT: 68 IN | HEART RATE: 86 BPM

## 2021-08-30 DIAGNOSIS — M25.561 CHRONIC PAIN OF BOTH KNEES: ICD-10-CM

## 2021-08-30 DIAGNOSIS — G89.29 CHRONIC PAIN OF BOTH KNEES: ICD-10-CM

## 2021-08-30 DIAGNOSIS — M25.562 CHRONIC PAIN OF BOTH KNEES: ICD-10-CM

## 2021-08-30 DIAGNOSIS — M79.672 LEFT FOOT PAIN: ICD-10-CM

## 2021-08-30 DIAGNOSIS — Z12.12 SCREENING FOR COLORECTAL CANCER: ICD-10-CM

## 2021-08-30 DIAGNOSIS — E78.2 MIXED HYPERLIPIDEMIA: ICD-10-CM

## 2021-08-30 DIAGNOSIS — E55.9 VITAMIN D INSUFFICIENCY: ICD-10-CM

## 2021-08-30 DIAGNOSIS — Z00.00 GENERAL MEDICAL EXAM: ICD-10-CM

## 2021-08-30 DIAGNOSIS — Z12.11 SCREENING FOR COLORECTAL CANCER: ICD-10-CM

## 2021-08-30 DIAGNOSIS — F33.0 MILD EPISODE OF RECURRENT MAJOR DEPRESSIVE DISORDER (HCC): ICD-10-CM

## 2021-08-30 PROCEDURE — 99396 PREV VISIT EST AGE 40-64: CPT | Performed by: PHYSICIAN ASSISTANT

## 2021-08-30 RX ORDER — CELECOXIB 100 MG/1
100 CAPSULE ORAL DAILY
Qty: 30 CAPSULE | Refills: 2 | Status: SHIPPED | OUTPATIENT
Start: 2021-08-30 | End: 2021-12-06 | Stop reason: SDUPTHER

## 2021-08-30 ASSESSMENT — FIBROSIS 4 INDEX: FIB4 SCORE: 0.77

## 2021-08-30 NOTE — ASSESSMENT & PLAN NOTE
Seeing Dr. Arthur Barkley. On trazodone 50 mg at night for sleep. On buspar, trintellix, clonazepam. Just got off wellbutrin.
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
## 2021-08-30 NOTE — ASSESSMENT & PLAN NOTE
Complains of continued foot pain. Swelling in left foot, on her feet long days. Swelling resolves in morning. She did see podiatrist for this- said she has arthritis. She does use Voltaren gel and this helps some.

## 2021-09-01 ENCOUNTER — TELEPHONE (OUTPATIENT)
Dept: MEDICAL GROUP | Facility: PHYSICIAN GROUP | Age: 56
End: 2021-09-01

## 2021-09-01 NOTE — TELEPHONE ENCOUNTER
DOCUMENTATION OF PAR STATUS:    1. Name of Medication & Dose: celecoxib 100mg caps     2. Name of Prescription Coverage Company & phone #: [employer]    3. Date Prior Auth Submitted: 09/01/2021    4. What information was given to obtain insurance decision? Cover My Meds    5. Prior Auth Status? Pending    6. Patient Notified: yes

## 2021-09-01 NOTE — TELEPHONE ENCOUNTER
FINAL PRIOR AUTHORIZATION STATUS:    1.  Name of Medication & Dose: celecoxib     2. Prior Auth Status: Approved through 09/01/2022     3. Action Taken: Pharmacy Notified: yes Patient Notified: yes

## 2021-09-25 ENCOUNTER — HOSPITAL ENCOUNTER (OUTPATIENT)
Dept: LAB | Facility: MEDICAL CENTER | Age: 56
End: 2021-09-25
Attending: PHYSICIAN ASSISTANT
Payer: COMMERCIAL

## 2021-09-25 DIAGNOSIS — E78.2 MIXED HYPERLIPIDEMIA: ICD-10-CM

## 2021-09-25 DIAGNOSIS — E55.9 VITAMIN D INSUFFICIENCY: ICD-10-CM

## 2021-09-25 DIAGNOSIS — Z00.00 GENERAL MEDICAL EXAM: ICD-10-CM

## 2021-09-25 LAB
25(OH)D3 SERPL-MCNC: 25 NG/ML (ref 30–100)
ALBUMIN SERPL BCP-MCNC: 4.1 G/DL (ref 3.2–4.9)
ALBUMIN/GLOB SERPL: 1.5 G/DL
ALP SERPL-CCNC: 100 U/L (ref 30–99)
ALT SERPL-CCNC: 18 U/L (ref 2–50)
ANION GAP SERPL CALC-SCNC: 11 MMOL/L (ref 7–16)
AST SERPL-CCNC: 19 U/L (ref 12–45)
BASOPHILS # BLD AUTO: 1.4 % (ref 0–1.8)
BASOPHILS # BLD: 0.07 K/UL (ref 0–0.12)
BILIRUB SERPL-MCNC: 0.6 MG/DL (ref 0.1–1.5)
BUN SERPL-MCNC: 17 MG/DL (ref 8–22)
CALCIUM SERPL-MCNC: 9 MG/DL (ref 8.5–10.5)
CHLORIDE SERPL-SCNC: 105 MMOL/L (ref 96–112)
CHOLEST SERPL-MCNC: 206 MG/DL (ref 100–199)
CO2 SERPL-SCNC: 25 MMOL/L (ref 20–33)
CREAT SERPL-MCNC: 0.63 MG/DL (ref 0.5–1.4)
EOSINOPHIL # BLD AUTO: 0.09 K/UL (ref 0–0.51)
EOSINOPHIL NFR BLD: 1.8 % (ref 0–6.9)
ERYTHROCYTE [DISTWIDTH] IN BLOOD BY AUTOMATED COUNT: 41.2 FL (ref 35.9–50)
FASTING STATUS PATIENT QL REPORTED: NORMAL
GLOBULIN SER CALC-MCNC: 2.7 G/DL (ref 1.9–3.5)
GLUCOSE SERPL-MCNC: 98 MG/DL (ref 65–99)
HCT VFR BLD AUTO: 41.7 % (ref 37–47)
HDLC SERPL-MCNC: 59 MG/DL
HGB BLD-MCNC: 13.8 G/DL (ref 12–16)
IMM GRANULOCYTES # BLD AUTO: 0.01 K/UL (ref 0–0.11)
IMM GRANULOCYTES NFR BLD AUTO: 0.2 % (ref 0–0.9)
LDLC SERPL CALC-MCNC: 130 MG/DL
LYMPHOCYTES # BLD AUTO: 1.8 K/UL (ref 1–4.8)
LYMPHOCYTES NFR BLD: 35.8 % (ref 22–41)
MCH RBC QN AUTO: 29.6 PG (ref 27–33)
MCHC RBC AUTO-ENTMCNC: 33.1 G/DL (ref 33.6–35)
MCV RBC AUTO: 89.3 FL (ref 81.4–97.8)
MONOCYTES # BLD AUTO: 0.44 K/UL (ref 0–0.85)
MONOCYTES NFR BLD AUTO: 8.7 % (ref 0–13.4)
NEUTROPHILS # BLD AUTO: 2.62 K/UL (ref 2–7.15)
NEUTROPHILS NFR BLD: 52.1 % (ref 44–72)
NRBC # BLD AUTO: 0 K/UL
NRBC BLD-RTO: 0 /100 WBC
PLATELET # BLD AUTO: 265 K/UL (ref 164–446)
PMV BLD AUTO: 10.7 FL (ref 9–12.9)
POTASSIUM SERPL-SCNC: 3.8 MMOL/L (ref 3.6–5.5)
PROT SERPL-MCNC: 6.8 G/DL (ref 6–8.2)
RBC # BLD AUTO: 4.67 M/UL (ref 4.2–5.4)
SODIUM SERPL-SCNC: 141 MMOL/L (ref 135–145)
TRIGL SERPL-MCNC: 83 MG/DL (ref 0–149)
TSH SERPL DL<=0.005 MIU/L-ACNC: 3.24 UIU/ML (ref 0.38–5.33)
WBC # BLD AUTO: 5 K/UL (ref 4.8–10.8)

## 2021-09-25 PROCEDURE — 36415 COLL VENOUS BLD VENIPUNCTURE: CPT

## 2021-09-25 PROCEDURE — 85025 COMPLETE CBC W/AUTO DIFF WBC: CPT

## 2021-09-25 PROCEDURE — 82306 VITAMIN D 25 HYDROXY: CPT

## 2021-09-25 PROCEDURE — 80061 LIPID PANEL: CPT

## 2021-09-25 PROCEDURE — 84443 ASSAY THYROID STIM HORMONE: CPT

## 2021-09-25 PROCEDURE — 80053 COMPREHEN METABOLIC PANEL: CPT

## 2021-09-28 ENCOUNTER — OFFICE VISIT (OUTPATIENT)
Dept: MEDICAL GROUP | Facility: PHYSICIAN GROUP | Age: 56
End: 2021-09-28
Payer: COMMERCIAL

## 2021-09-28 VITALS
BODY MASS INDEX: 42.59 KG/M2 | RESPIRATION RATE: 12 BRPM | SYSTOLIC BLOOD PRESSURE: 110 MMHG | TEMPERATURE: 97 F | HEIGHT: 68 IN | DIASTOLIC BLOOD PRESSURE: 78 MMHG | WEIGHT: 281 LBS | OXYGEN SATURATION: 96 % | HEART RATE: 67 BPM

## 2021-09-28 DIAGNOSIS — Z23 NEED FOR VACCINATION: ICD-10-CM

## 2021-09-28 DIAGNOSIS — E78.5 DYSLIPIDEMIA: ICD-10-CM

## 2021-09-28 DIAGNOSIS — Z12.31 ENCOUNTER FOR SCREENING MAMMOGRAM FOR BREAST CANCER: ICD-10-CM

## 2021-09-28 DIAGNOSIS — E55.9 VITAMIN D INSUFFICIENCY: ICD-10-CM

## 2021-09-28 PROCEDURE — 90686 IIV4 VACC NO PRSV 0.5 ML IM: CPT | Performed by: PHYSICIAN ASSISTANT

## 2021-09-28 PROCEDURE — 90471 IMMUNIZATION ADMIN: CPT | Performed by: PHYSICIAN ASSISTANT

## 2021-09-28 PROCEDURE — 99214 OFFICE O/P EST MOD 30 MIN: CPT | Mod: 25 | Performed by: PHYSICIAN ASSISTANT

## 2021-09-28 RX ORDER — BUSPIRONE HYDROCHLORIDE 15 MG/1
TABLET ORAL
COMMUNITY
Start: 2021-09-09 | End: 2023-11-07

## 2021-09-28 ASSESSMENT — PATIENT HEALTH QUESTIONNAIRE - PHQ9: CLINICAL INTERPRETATION OF PHQ2 SCORE: 0

## 2021-09-28 ASSESSMENT — FIBROSIS 4 INDEX: FIB4 SCORE: 0.9
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
## 2021-09-28 NOTE — ASSESSMENT & PLAN NOTE
Off Wellbutrin, finding that she is more hungry. Discussed that phentermine would not be a great choice for her with her history of anxiety. She has tried a dietician.

## 2021-09-28 NOTE — ASSESSMENT & PLAN NOTE
This is a  chronic condition.   Supplementation: she is supplementing 2000 IU daily, recommend increasing to 5000 IU daily.   Last Vitamin D level:   VIT D:   Lab Results   Component Value Date/Time    25HYDROXY 25 (L) 09/25/2021 0752     Patient denies any muscle aches or fatigue.

## 2021-09-28 NOTE — ASSESSMENT & PLAN NOTE
This is a chronic condition.   Latest Labs:   Lab Results   Component Value Date/Time    CHOLSTRLTOT 206 (H) 09/25/2021 07:52 AM     (H) 09/25/2021 07:52 AM    HDL 59 09/25/2021 07:52 AM    TRIGLYCERIDE 83 09/25/2021 07:52 AM      Medications: none, trend, down from last labs.   Risk calculator: The 10-year ASCVD risk score (Nolbertocheryl ACEVEDO Jr., et al., 2013) is: 1.3%

## 2021-12-06 RX ORDER — CELECOXIB 100 MG/1
100 CAPSULE ORAL DAILY
Qty: 30 CAPSULE | Refills: 2 | Status: SHIPPED | OUTPATIENT
Start: 2021-12-06

## 2022-04-18 ENCOUNTER — HOSPITAL ENCOUNTER (OUTPATIENT)
Dept: RADIOLOGY | Facility: MEDICAL CENTER | Age: 57
End: 2022-04-18
Attending: PHYSICIAN ASSISTANT
Payer: COMMERCIAL

## 2022-04-18 DIAGNOSIS — Z12.31 VISIT FOR SCREENING MAMMOGRAM: ICD-10-CM

## 2022-04-18 PROCEDURE — 77063 BREAST TOMOSYNTHESIS BI: CPT

## 2022-05-09 ENCOUNTER — APPOINTMENT (OUTPATIENT)
Dept: MEDICAL GROUP | Facility: PHYSICIAN GROUP | Age: 57
End: 2022-05-09
Payer: COMMERCIAL

## 2022-10-03 ENCOUNTER — HOSPITAL ENCOUNTER (OUTPATIENT)
Dept: LAB | Facility: MEDICAL CENTER | Age: 57
End: 2022-10-03
Attending: FAMILY MEDICINE
Payer: COMMERCIAL

## 2022-10-03 LAB
25(OH)D3 SERPL-MCNC: 25 NG/ML (ref 30–100)
ALBUMIN SERPL BCP-MCNC: 4.3 G/DL (ref 3.2–4.9)
ALBUMIN/GLOB SERPL: 1.6 G/DL
ALP SERPL-CCNC: 99 U/L (ref 30–99)
ALT SERPL-CCNC: 12 U/L (ref 2–50)
ANION GAP SERPL CALC-SCNC: 10 MMOL/L (ref 7–16)
AST SERPL-CCNC: 17 U/L (ref 12–45)
BASOPHILS # BLD AUTO: 1.7 % (ref 0–1.8)
BASOPHILS # BLD: 0.09 K/UL (ref 0–0.12)
BILIRUB SERPL-MCNC: 0.4 MG/DL (ref 0.1–1.5)
BUN SERPL-MCNC: 10 MG/DL (ref 8–22)
CALCIUM SERPL-MCNC: 9 MG/DL (ref 8.5–10.5)
CHLORIDE SERPL-SCNC: 108 MMOL/L (ref 96–112)
CHOLEST SERPL-MCNC: 232 MG/DL (ref 100–199)
CO2 SERPL-SCNC: 26 MMOL/L (ref 20–33)
CREAT SERPL-MCNC: 0.58 MG/DL (ref 0.5–1.4)
EOSINOPHIL # BLD AUTO: 0.08 K/UL (ref 0–0.51)
EOSINOPHIL NFR BLD: 1.6 % (ref 0–6.9)
ERYTHROCYTE [DISTWIDTH] IN BLOOD BY AUTOMATED COUNT: 39.9 FL (ref 35.9–50)
GFR SERPLBLD CREATININE-BSD FMLA CKD-EPI: 107 ML/MIN/1.73 M 2
GLOBULIN SER CALC-MCNC: 2.7 G/DL (ref 1.9–3.5)
GLUCOSE SERPL-MCNC: 88 MG/DL (ref 65–99)
HCT VFR BLD AUTO: 44.2 % (ref 37–47)
HDLC SERPL-MCNC: 51 MG/DL
HGB BLD-MCNC: 15.2 G/DL (ref 12–16)
IMM GRANULOCYTES # BLD AUTO: 0.01 K/UL (ref 0–0.11)
IMM GRANULOCYTES NFR BLD AUTO: 0.2 % (ref 0–0.9)
LDLC SERPL CALC-MCNC: 158 MG/DL
LYMPHOCYTES # BLD AUTO: 1.78 K/UL (ref 1–4.8)
LYMPHOCYTES NFR BLD: 34.6 % (ref 22–41)
MCH RBC QN AUTO: 30.6 PG (ref 27–33)
MCHC RBC AUTO-ENTMCNC: 34.4 G/DL (ref 33.6–35)
MCV RBC AUTO: 88.9 FL (ref 81.4–97.8)
MONOCYTES # BLD AUTO: 0.45 K/UL (ref 0–0.85)
MONOCYTES NFR BLD AUTO: 8.7 % (ref 0–13.4)
NEUTROPHILS # BLD AUTO: 2.74 K/UL (ref 2–7.15)
NEUTROPHILS NFR BLD: 53.2 % (ref 44–72)
NRBC # BLD AUTO: 0 K/UL
NRBC BLD-RTO: 0 /100 WBC
PLATELET # BLD AUTO: 293 K/UL (ref 164–446)
PMV BLD AUTO: 10.4 FL (ref 9–12.9)
POTASSIUM SERPL-SCNC: 4.1 MMOL/L (ref 3.6–5.5)
PROT SERPL-MCNC: 7 G/DL (ref 6–8.2)
RBC # BLD AUTO: 4.97 M/UL (ref 4.2–5.4)
SODIUM SERPL-SCNC: 144 MMOL/L (ref 135–145)
TRIGL SERPL-MCNC: 114 MG/DL (ref 0–149)
TSH SERPL DL<=0.005 MIU/L-ACNC: 2.49 UIU/ML (ref 0.38–5.33)
WBC # BLD AUTO: 5.2 K/UL (ref 4.8–10.8)

## 2022-10-03 PROCEDURE — 80053 COMPREHEN METABOLIC PANEL: CPT

## 2022-10-03 PROCEDURE — 84443 ASSAY THYROID STIM HORMONE: CPT

## 2022-10-03 PROCEDURE — 80061 LIPID PANEL: CPT

## 2022-10-03 PROCEDURE — 82306 VITAMIN D 25 HYDROXY: CPT

## 2022-10-03 PROCEDURE — 36415 COLL VENOUS BLD VENIPUNCTURE: CPT

## 2022-10-03 PROCEDURE — 85025 COMPLETE CBC W/AUTO DIFF WBC: CPT

## 2022-12-03 ENCOUNTER — OFFICE VISIT (OUTPATIENT)
Dept: URGENT CARE | Facility: PHYSICIAN GROUP | Age: 57
End: 2022-12-03
Payer: COMMERCIAL

## 2022-12-03 VITALS
WEIGHT: 285 LBS | OXYGEN SATURATION: 95 % | HEART RATE: 104 BPM | RESPIRATION RATE: 16 BRPM | SYSTOLIC BLOOD PRESSURE: 118 MMHG | TEMPERATURE: 98.1 F | DIASTOLIC BLOOD PRESSURE: 70 MMHG | BODY MASS INDEX: 43.19 KG/M2 | HEIGHT: 68 IN

## 2022-12-03 DIAGNOSIS — J02.9 SORE THROAT: ICD-10-CM

## 2022-12-03 LAB
INT CON NEG: NORMAL
INT CON POS: NORMAL
S PYO AG THROAT QL: NEGATIVE

## 2022-12-03 PROCEDURE — 99213 OFFICE O/P EST LOW 20 MIN: CPT | Performed by: PHYSICIAN ASSISTANT

## 2022-12-03 PROCEDURE — 87880 STREP A ASSAY W/OPTIC: CPT | Performed by: PHYSICIAN ASSISTANT

## 2022-12-03 RX ORDER — ATORVASTATIN CALCIUM 20 MG/1
1 TABLET, FILM COATED ORAL DAILY
COMMUNITY
Start: 2022-10-10

## 2022-12-03 RX ORDER — LIDOCAINE HYDROCHLORIDE 20 MG/ML
SOLUTION OROPHARYNGEAL
Qty: 100 ML | Refills: 0 | Status: SHIPPED | OUTPATIENT
Start: 2022-12-03 | End: 2023-11-07

## 2022-12-03 ASSESSMENT — FIBROSIS 4 INDEX: FIB4 SCORE: 0.92

## 2022-12-03 ASSESSMENT — ENCOUNTER SYMPTOMS
FEVER: 0
COUGH: 1
CHILLS: 0
SORE THROAT: 1

## 2022-12-03 NOTE — PROGRESS NOTES
Subjective:   Chioma Mock is a 57 y.o. female who presents today with   Chief Complaint   Patient presents with    Pharyngitis     X yesterday    Cough     X yesterday       Pharyngitis   This is a new problem. The current episode started yesterday. The problem has been unchanged. There has been no fever. Associated symptoms include coughing. Treatments tried: Theraflu, Tylenol. The treatment provided mild relief.     PMH:  has a past medical history of Adjustment reaction with anxiety and depression (8/22/2009), Benign Neoplasm of Muscle left leg. 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  MEDS:   Current Outpatient Medications:     atorvastatin (LIPITOR) 20 MG Tab, Take 1 Tablet by mouth every day., Disp: , Rfl:     celecoxib (CELEBREX) 100 MG Cap, Take 1 Capsule by mouth every day., Disp: 30 Capsule, Rfl: 2    Vitamin D, Cholecalciferol, (CHOLECALCIFEROL) 25 MCG (1000 UT) Tab, Take 2 Tabs by mouth every day., Disp: 30 Tab, Rfl: 1    Vortioxetine HBr (TRINTELLIX) 20 MG Tab, Take  by mouth., Disp: , Rfl:     modafinil (PROVIGIL) 200 MG Tab, Take 200 mg by mouth every day., Disp: , Rfl:     levonorgestrel (MIRENA) 52 mg (20 mcg/24 hr) IUD, 1 Each by Intrauterine route Once., Disp: , Rfl:     clonazepam (KLONOPIN) 0.5 MG TABS, Take 0.5 mg by mouth every bedtime., Disp: , Rfl:     busPIRone (BUSPAR) 15 MG tablet, , Disp: , Rfl:   ALLERGIES:   Allergies   Allergen Reactions    Norco [Hydrocodone-Acetaminophen]      Vomiting   "    SURGHX:   Past Surgical History:   Procedure Laterality Date    TUMOR EXCISION WITH BIOPSY  2006    benign tumor left lower     SOCHX:  reports that she has never smoked. She has never used smokeless tobacco. She reports current alcohol use. She reports that she does not use drugs.  FH: Reviewed with patient, not pertinent to this visit.     Review of Systems   Constitutional:  Negative for chills and fever.   HENT:  Positive for sore throat.    Respiratory:  Positive for cough.       Objective:   /70   Pulse (!) 104   Temp 36.7 °C (98.1 °F) (Temporal)   Resp 16   Ht 1.727 m (5' 8\")   Wt (!) 129 kg (285 lb)   SpO2 95%   BMI 43.33 kg/m²   Physical Exam  Vitals and nursing note reviewed.   Constitutional:       General: She is not in acute distress.     Appearance: Normal appearance. She is well-developed. She is not ill-appearing or toxic-appearing.   HENT:      Head: Normocephalic and atraumatic.      Right Ear: Hearing normal.      Left Ear: Hearing normal.      Mouth/Throat:      Mouth: Mucous membranes are moist.      Pharynx: Uvula midline. Posterior oropharyngeal erythema present. No oropharyngeal exudate or uvula swelling.      Tonsils: No tonsillar exudate or tonsillar abscesses.   Cardiovascular:      Rate and Rhythm: Normal rate and regular rhythm.      Heart sounds: Normal heart sounds.   Pulmonary:      Effort: Pulmonary effort is normal.      Breath sounds: Normal breath sounds. No stridor. No wheezing, rhonchi or rales.   Musculoskeletal:      Comments: Normal movement in all 4 extremities   Lymphadenopathy:      Cervical: No cervical adenopathy.   Skin:     General: Skin is warm and dry.   Neurological:      Mental Status: She is alert.      Coordination: Coordination normal.   Psychiatric:         Mood and Affect: Mood normal.     STREP A      Assessment/Plan:   Assessment    1. Sore throat  - POCT Rapid Strep A    Other orders  - atorvastatin (LIPITOR) 20 MG Tab; Take 1 Tablet by " mouth every day.  Symptoms and presentation consistent with viral illness at this time.  Vital signs are stable on exam today.  Discussed CDC guidelines including self isolation at home.   Patient encouraged to get plenty of rest, use OTC tylenol for pain/fever, and drink plenty of fluids.    Offered COVID testing today but patient declines.  Recommend that she do home test after she has had at least 2 days of symptoms and she is understanding and agreeable.    Differential diagnosis, natural history, supportive care, and indications for immediate follow-up discussed.   Patient given instructions and understanding of medications and treatment.    If not improving in 3-5 days, F/U with PCP or return to  if symptoms worsen.    Patient agreeable to plan.      Please note that this dictation was created using voice recognition software. I have made every reasonable attempt to correct obvious errors, but I expect that there are errors of grammar and possibly content that I did not discover before finalizing the note.    Lex Kent PA-C

## 2023-01-31 ENCOUNTER — HOSPITAL ENCOUNTER (OUTPATIENT)
Dept: LAB | Facility: MEDICAL CENTER | Age: 58
End: 2023-01-31
Attending: FAMILY MEDICINE
Payer: COMMERCIAL

## 2023-01-31 LAB
ALBUMIN SERPL BCP-MCNC: 4.2 G/DL (ref 3.2–4.9)
ALBUMIN/GLOB SERPL: 1.5 G/DL
ALP SERPL-CCNC: 117 U/L (ref 30–99)
ALT SERPL-CCNC: 15 U/L (ref 2–50)
ANION GAP SERPL CALC-SCNC: 10 MMOL/L (ref 7–16)
AST SERPL-CCNC: 17 U/L (ref 12–45)
BILIRUB SERPL-MCNC: 0.3 MG/DL (ref 0.1–1.5)
BUN SERPL-MCNC: 14 MG/DL (ref 8–22)
CALCIUM ALBUM COR SERPL-MCNC: 8.6 MG/DL (ref 8.5–10.5)
CALCIUM SERPL-MCNC: 8.8 MG/DL (ref 8.5–10.5)
CHLORIDE SERPL-SCNC: 103 MMOL/L (ref 96–112)
CHOLEST SERPL-MCNC: 153 MG/DL (ref 100–199)
CO2 SERPL-SCNC: 27 MMOL/L (ref 20–33)
CREAT SERPL-MCNC: 0.63 MG/DL (ref 0.5–1.4)
FASTING STATUS PATIENT QL REPORTED: NORMAL
GFR SERPLBLD CREATININE-BSD FMLA CKD-EPI: 105 ML/MIN/1.73 M 2
GLOBULIN SER CALC-MCNC: 2.8 G/DL (ref 1.9–3.5)
GLUCOSE SERPL-MCNC: 97 MG/DL (ref 65–99)
HDLC SERPL-MCNC: 55 MG/DL
LDLC SERPL CALC-MCNC: 83 MG/DL
POTASSIUM SERPL-SCNC: 3.7 MMOL/L (ref 3.6–5.5)
PROT SERPL-MCNC: 7 G/DL (ref 6–8.2)
SODIUM SERPL-SCNC: 140 MMOL/L (ref 135–145)
TRIGL SERPL-MCNC: 76 MG/DL (ref 0–149)

## 2023-01-31 PROCEDURE — 80053 COMPREHEN METABOLIC PANEL: CPT

## 2023-01-31 PROCEDURE — 36415 COLL VENOUS BLD VENIPUNCTURE: CPT

## 2023-01-31 PROCEDURE — 80061 LIPID PANEL: CPT

## 2023-10-02 ENCOUNTER — HOSPITAL ENCOUNTER (OUTPATIENT)
Dept: LAB | Facility: MEDICAL CENTER | Age: 58
End: 2023-10-02
Attending: FAMILY MEDICINE
Payer: COMMERCIAL

## 2023-10-02 LAB
25(OH)D3 SERPL-MCNC: 29 NG/ML (ref 30–100)
ALBUMIN SERPL BCP-MCNC: 4.2 G/DL (ref 3.2–4.9)
ALBUMIN/GLOB SERPL: 1.4 G/DL
ALP SERPL-CCNC: 110 U/L (ref 30–99)
ALT SERPL-CCNC: 18 U/L (ref 2–50)
ANION GAP SERPL CALC-SCNC: 9 MMOL/L (ref 7–16)
AST SERPL-CCNC: 14 U/L (ref 12–45)
BASOPHILS # BLD AUTO: 1.2 % (ref 0–1.8)
BASOPHILS # BLD: 0.07 K/UL (ref 0–0.12)
BILIRUB SERPL-MCNC: 0.5 MG/DL (ref 0.1–1.5)
BUN SERPL-MCNC: 13 MG/DL (ref 8–22)
CALCIUM ALBUM COR SERPL-MCNC: 9.1 MG/DL (ref 8.5–10.5)
CALCIUM SERPL-MCNC: 9.3 MG/DL (ref 8.5–10.5)
CHLORIDE SERPL-SCNC: 106 MMOL/L (ref 96–112)
CHOLEST SERPL-MCNC: 142 MG/DL (ref 100–199)
CO2 SERPL-SCNC: 27 MMOL/L (ref 20–33)
CREAT SERPL-MCNC: 0.58 MG/DL (ref 0.5–1.4)
EOSINOPHIL # BLD AUTO: 0.09 K/UL (ref 0–0.51)
EOSINOPHIL NFR BLD: 1.6 % (ref 0–6.9)
ERYTHROCYTE [DISTWIDTH] IN BLOOD BY AUTOMATED COUNT: 40.5 FL (ref 35.9–50)
EST. AVERAGE GLUCOSE BLD GHB EST-MCNC: 111 MG/DL
FASTING STATUS PATIENT QL REPORTED: NORMAL
FSH SERPL-ACNC: 67 MIU/ML
GFR SERPLBLD CREATININE-BSD FMLA CKD-EPI: 106 ML/MIN/1.73 M 2
GLOBULIN SER CALC-MCNC: 3 G/DL (ref 1.9–3.5)
GLUCOSE SERPL-MCNC: 85 MG/DL (ref 65–99)
HBA1C MFR BLD: 5.5 % (ref 4–5.6)
HCT VFR BLD AUTO: 46.4 % (ref 37–47)
HDLC SERPL-MCNC: 43 MG/DL
HGB BLD-MCNC: 15.2 G/DL (ref 12–16)
IMM GRANULOCYTES # BLD AUTO: 0.01 K/UL (ref 0–0.11)
IMM GRANULOCYTES NFR BLD AUTO: 0.2 % (ref 0–0.9)
LDLC SERPL CALC-MCNC: 83 MG/DL
LYMPHOCYTES # BLD AUTO: 1.71 K/UL (ref 1–4.8)
LYMPHOCYTES NFR BLD: 30.4 % (ref 22–41)
MCH RBC QN AUTO: 28.8 PG (ref 27–33)
MCHC RBC AUTO-ENTMCNC: 32.8 G/DL (ref 32.2–35.5)
MCV RBC AUTO: 87.9 FL (ref 81.4–97.8)
MONOCYTES # BLD AUTO: 0.43 K/UL (ref 0–0.85)
MONOCYTES NFR BLD AUTO: 7.7 % (ref 0–13.4)
NEUTROPHILS # BLD AUTO: 3.31 K/UL (ref 1.82–7.42)
NEUTROPHILS NFR BLD: 58.9 % (ref 44–72)
NRBC # BLD AUTO: 0 K/UL
NRBC BLD-RTO: 0 /100 WBC (ref 0–0.2)
PLATELET # BLD AUTO: 319 K/UL (ref 164–446)
PMV BLD AUTO: 10.9 FL (ref 9–12.9)
POTASSIUM SERPL-SCNC: 3.8 MMOL/L (ref 3.6–5.5)
PROT SERPL-MCNC: 7.2 G/DL (ref 6–8.2)
RBC # BLD AUTO: 5.28 M/UL (ref 4.2–5.4)
SODIUM SERPL-SCNC: 142 MMOL/L (ref 135–145)
TRIGL SERPL-MCNC: 78 MG/DL (ref 0–149)
TSH SERPL DL<=0.005 MIU/L-ACNC: 2.19 UIU/ML (ref 0.38–5.33)
WBC # BLD AUTO: 5.6 K/UL (ref 4.8–10.8)

## 2023-10-02 PROCEDURE — 85025 COMPLETE CBC W/AUTO DIFF WBC: CPT

## 2023-10-02 PROCEDURE — 80061 LIPID PANEL: CPT

## 2023-10-02 PROCEDURE — 83036 HEMOGLOBIN GLYCOSYLATED A1C: CPT

## 2023-10-02 PROCEDURE — 80053 COMPREHEN METABOLIC PANEL: CPT

## 2023-10-02 PROCEDURE — 84443 ASSAY THYROID STIM HORMONE: CPT

## 2023-10-02 PROCEDURE — 36415 COLL VENOUS BLD VENIPUNCTURE: CPT

## 2023-10-02 PROCEDURE — 82306 VITAMIN D 25 HYDROXY: CPT

## 2023-10-02 PROCEDURE — 83001 ASSAY OF GONADOTROPIN (FSH): CPT

## 2023-11-07 ENCOUNTER — OFFICE VISIT (OUTPATIENT)
Dept: OBGYN | Facility: CLINIC | Age: 58
End: 2023-11-07
Payer: COMMERCIAL

## 2023-11-07 ENCOUNTER — HOSPITAL ENCOUNTER (OUTPATIENT)
Facility: MEDICAL CENTER | Age: 58
End: 2023-11-07
Attending: OBSTETRICS & GYNECOLOGY
Payer: COMMERCIAL

## 2023-11-07 VITALS — WEIGHT: 275.2 LBS | SYSTOLIC BLOOD PRESSURE: 138 MMHG | BODY MASS INDEX: 41.84 KG/M2 | DIASTOLIC BLOOD PRESSURE: 77 MMHG

## 2023-11-07 DIAGNOSIS — Z01.419 WELL WOMAN EXAM WITH ROUTINE GYNECOLOGICAL EXAM: ICD-10-CM

## 2023-11-07 DIAGNOSIS — T83.32XD INTRAUTERINE CONTRACEPTIVE DEVICE THREADS LOST, SUBSEQUENT ENCOUNTER: ICD-10-CM

## 2023-11-07 PROCEDURE — 99386 PREV VISIT NEW AGE 40-64: CPT | Performed by: OBSTETRICS & GYNECOLOGY

## 2023-11-07 PROCEDURE — 3075F SYST BP GE 130 - 139MM HG: CPT | Performed by: OBSTETRICS & GYNECOLOGY

## 2023-11-07 PROCEDURE — 88175 CYTOPATH C/V AUTO FLUID REDO: CPT

## 2023-11-07 PROCEDURE — 3078F DIAST BP <80 MM HG: CPT | Performed by: OBSTETRICS & GYNECOLOGY

## 2023-11-07 PROCEDURE — 87624 HPV HI-RISK TYP POOLED RSLT: CPT

## 2023-11-07 ASSESSMENT — FIBROSIS 4 INDEX: FIB4 SCORE: 0.57
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
## 2023-11-07 NOTE — PROGRESS NOTES
Patient is here as a New patient- Establish Care/Annual. Her Primary Doctor did a blood test that said she was postmenopausal. Her Weight is 275.2 lb and BP is 138/77. She had a Mammogram done 04/18/2022. She has not had a LMP due to her having the IUD in place. She has had the Mirena IUD in place over 10 years. Her Last Pap was done ion 08/31/2018.

## 2023-11-11 LAB
CYTOLOGIST CVX/VAG CYTO: NORMAL
CYTOLOGY CVX/VAG DOC CYTO: NORMAL
CYTOLOGY CVX/VAG DOC THIN PREP: NORMAL
HPV I/H RISK 4 DNA CVX QL PROBE+SIG AMP: NEGATIVE
NOTE NL11727A: NORMAL
OTHER STN SPEC: NORMAL
STAT OF ADQ CVX/VAG CYTO-IMP: NORMAL

## 2023-11-22 ENCOUNTER — HOSPITAL ENCOUNTER (OUTPATIENT)
Dept: RADIOLOGY | Facility: MEDICAL CENTER | Age: 58
End: 2023-11-22
Attending: OBSTETRICS & GYNECOLOGY
Payer: COMMERCIAL

## 2023-11-22 DIAGNOSIS — Z12.31 VISIT FOR SCREENING MAMMOGRAM: ICD-10-CM

## 2023-11-22 PROCEDURE — 77063 BREAST TOMOSYNTHESIS BI: CPT

## 2023-12-14 ENCOUNTER — HOSPITAL ENCOUNTER (OUTPATIENT)
Dept: RADIOLOGY | Facility: MEDICAL CENTER | Age: 58
End: 2023-12-14
Attending: OBSTETRICS & GYNECOLOGY
Payer: COMMERCIAL

## 2023-12-14 DIAGNOSIS — T83.32XD INTRAUTERINE CONTRACEPTIVE DEVICE THREADS LOST, SUBSEQUENT ENCOUNTER: ICD-10-CM

## 2023-12-14 PROCEDURE — 76830 TRANSVAGINAL US NON-OB: CPT

## 2023-12-20 ENCOUNTER — TELEPHONE (OUTPATIENT)
Dept: OBGYN | Facility: CLINIC | Age: 58
End: 2023-12-20
Payer: COMMERCIAL

## 2023-12-20 NOTE — TELEPHONE ENCOUNTER
12/20/2023 1316  View All Conversations on this Encounter  [facility] (supporting You)20 hours ago (4:55 PM)     DW  I want to have it removed. That was part of the reason for the ultrasound and the conversation I had when I came in. It has to be done under anesthesia because it’s too painful otherwise. So, need to have a time set up to do that.   Chioma       Declan MockYesterday (9:04 AM)     Dr Fortino Victor wanted to let you know that your Pelvic Ultrasound came back normal and you IUD is in the correct position.  Please let us know if you have any questions.  Thank you,  Karolyn AJ RN       Left message for pt to call back regarding  [employer] message.    12/21/2023 0942  Left message for pt to call back regarding [employer] message.    12/22/2023 0957  Called pt and scheduled for appt with Dr Freitas on 1/11/2024 at 1445. Pt agreed and verbalized understanding.

## 2024-01-11 ENCOUNTER — APPOINTMENT (OUTPATIENT)
Dept: OBGYN | Facility: CLINIC | Age: 59
End: 2024-01-11
Payer: COMMERCIAL

## 2024-01-11 ENCOUNTER — TELEMEDICINE (OUTPATIENT)
Dept: OBGYN | Facility: CLINIC | Age: 59
End: 2024-01-11

## 2024-01-11 DIAGNOSIS — T83.32XD INTRAUTERINE CONTRACEPTIVE DEVICE THREADS LOST, SUBSEQUENT ENCOUNTER: ICD-10-CM

## 2024-01-11 PROCEDURE — 99999 PR NO CHARGE: CPT | Performed by: OBSTETRICS & GYNECOLOGY
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
## 2024-02-20 ENCOUNTER — APPOINTMENT (OUTPATIENT)
Dept: ADMISSIONS | Facility: MEDICAL CENTER | Age: 59
End: 2024-02-20
Attending: OBSTETRICS & GYNECOLOGY
Payer: COMMERCIAL

## 2024-02-29 ENCOUNTER — APPOINTMENT (OUTPATIENT)
Dept: ADMISSIONS | Facility: MEDICAL CENTER | Age: 59
End: 2024-02-29
Attending: OBSTETRICS & GYNECOLOGY
Payer: COMMERCIAL

## 2024-03-18 NOTE — OP THERAPY DAILY TREATMENT
Outpatient Physical Therapy  DAILY TREATMENT     [facility] [place]  [address], [address]  [place] [address]  Phone:  [phone]  Fax:  [phone]    Date: 07/29/2021    Patient: Chioma Mock  YOB: 1965  MRN: [id number]     Time Calculation    Start time: 1030  Stop time: 1115 Time Calculation (min): 45 minutes         Chief Complaint: thigh/ quad problem  Visit #: 7    SUBJECTIVE:  Overall doing well knee vs thigh pain occurring during stepping down the stairs.      OBJECTIVE:  Current objective measures: hip extension 4+/5, knee extension 5/5          Therapeutic Exercises (CPT 50091):     1. Bike lvl 8, 8min    2. Shuttle , DL 6c -8c x 30 each     3. MAQ, heels off table, 5# 20 each alternating    4. Sit to stand, x 10, 56seconds    5. Standing ankle rocker, 2min    6. Standing tandem balance, with knees slightly flexed x 2min each position with airex pad 2min each position    7. TRX squat, to chair x 15, NT    8. Calf raise, Shuttle 3c x 20, , NT    9. Hip abduction slider , orange band x 15 each, 0# x 15 each    10. Sissy squat chair assisted, x 15    11. Step down and up, 6in retro x 15 each, 4 in forward x 10 each      Therapeutic Exercise Summary: HEP instruction/performance and development. Handout provided and exercises located below:  Access Code: [email or web address]  URL: [email or web address]  Date: 07/29/2021  Prepared by: Pedro Riggins    Exercises        Sit to Stand without Arm Support - 2 x daily - 10 reps      Active Straight Leg Raise with Quad Set - 2 x daily - 2 sets - 5 reps      Beginner Bridge - 1 x daily - 15 reps      Tandem Stance with Support - 2 x daily - 2 sets - 30 hold      Clamshell - 1 x daily - 2 sets - 10-15 reps      Time-based treatments/modalities:    Physical Therapy Timed Treatment Charges  Therapeutic exercise minutes (CPT 47547): 45 minutes      Pain rating (1-10) before treatment:  0  Pain rating (1-10) after treatment:   ED&C  Starting tomorrow you may change the bandage.  Clean with Dial antibacterial soap and pat dry.  Apply Vaseline or Mupirocin ointment, then apply clean bandage.   Repeat above steps for 7 days.  On day 8, wash with Dial antibacterial soap, apply Vaseline or Mupirocin ointment then leave open.   No swimming, soaking in bath tub, or hot tub until it is scabbed over.   Only Tylenol is recommended for pain. No Advil, Ibuprofen, Aleve, Motrin, or Aspirin.  There will be a scar.        Cryotherapy  There will likely be a blister.   Clean the area daily with dial antibacterial soap and water.   Apply vaseline as needed.   The area will take 1-2 weeks to heal.     0    ASSESSMENT:   Response to treatment: Pt to promote to independence for continued strengthening. D/C to HEP.     PLAN/RECOMMENDATIONS:   Plan for treatment: discharge patient due to patient/family choice.

## 2024-03-26 ENCOUNTER — PRE-ADMISSION TESTING (OUTPATIENT)
Dept: ADMISSIONS | Facility: MEDICAL CENTER | Age: 59
End: 2024-03-26
Attending: OBSTETRICS & GYNECOLOGY
Payer: COMMERCIAL

## 2024-03-26 RX ORDER — SEMAGLUTIDE 0.68 MG/ML
0.5 INJECTION, SOLUTION SUBCUTANEOUS
COMMUNITY

## 2024-03-26 NOTE — OR NURSING
PONV    Patient instructed to follow up with her prescribing physician regarding holding her esketamine prior to her procedure. States she will do so.

## 2024-04-01 ENCOUNTER — APPOINTMENT (OUTPATIENT)
Dept: OBGYN | Facility: CLINIC | Age: 59
End: 2024-04-01
Payer: COMMERCIAL

## 2024-04-01 ENCOUNTER — PRE-ADMISSION TESTING (OUTPATIENT)
Dept: ADMISSIONS | Facility: MEDICAL CENTER | Age: 59
End: 2024-04-01
Attending: OBSTETRICS & GYNECOLOGY
Payer: COMMERCIAL

## 2024-04-01 VITALS — DIASTOLIC BLOOD PRESSURE: 79 MMHG | WEIGHT: 255 LBS | SYSTOLIC BLOOD PRESSURE: 118 MMHG | BODY MASS INDEX: 38.77 KG/M2

## 2024-04-01 DIAGNOSIS — Z01.810 PRE-OPERATIVE CARDIOVASCULAR EXAMINATION: ICD-10-CM

## 2024-04-01 DIAGNOSIS — Z01.812 PRE-OPERATIVE LABORATORY EXAMINATION: ICD-10-CM

## 2024-04-01 DIAGNOSIS — Z01.818 PREOPERATIVE EXAM FOR GYNECOLOGIC SURGERY: ICD-10-CM

## 2024-04-01 DIAGNOSIS — T83.39XA RETAINED INTRAUTERINE CONTRACEPTIVE DEVICE (IUD): ICD-10-CM

## 2024-04-01 LAB
ABO GROUP BLD: NORMAL
APPEARANCE UR: ABNORMAL
BACTERIA #/AREA URNS HPF: ABNORMAL /HPF
BASOPHILS # BLD AUTO: 0.8 % (ref 0–1.8)
BASOPHILS # BLD: 0.04 K/UL (ref 0–0.12)
BILIRUB UR QL STRIP.AUTO: NEGATIVE
BLD GP AB SCN SERPL QL: NORMAL
CAOX CRY #/AREA URNS HPF: ABNORMAL /HPF
COLOR UR: ABNORMAL
EOSINOPHIL # BLD AUTO: 0.07 K/UL (ref 0–0.51)
EOSINOPHIL NFR BLD: 1.4 % (ref 0–6.9)
EPI CELLS #/AREA URNS HPF: ABNORMAL /HPF
ERYTHROCYTE [DISTWIDTH] IN BLOOD BY AUTOMATED COUNT: 38.7 FL (ref 35.9–50)
GLUCOSE UR STRIP.AUTO-MCNC: NEGATIVE MG/DL
HCT VFR BLD AUTO: 43.4 % (ref 37–47)
HGB BLD-MCNC: 14.6 G/DL (ref 12–16)
HYALINE CASTS #/AREA URNS LPF: ABNORMAL /LPF
IMM GRANULOCYTES # BLD AUTO: 0.01 K/UL (ref 0–0.11)
IMM GRANULOCYTES NFR BLD AUTO: 0.2 % (ref 0–0.9)
KETONES UR STRIP.AUTO-MCNC: ABNORMAL MG/DL
LEUKOCYTE ESTERASE UR QL STRIP.AUTO: ABNORMAL
LYMPHOCYTES # BLD AUTO: 1.81 K/UL (ref 1–4.8)
LYMPHOCYTES NFR BLD: 35.8 % (ref 22–41)
MCH RBC QN AUTO: 29.5 PG (ref 27–33)
MCHC RBC AUTO-ENTMCNC: 33.6 G/DL (ref 32.2–35.5)
MCV RBC AUTO: 87.7 FL (ref 81.4–97.8)
MICRO URNS: ABNORMAL
MONOCYTES # BLD AUTO: 0.46 K/UL (ref 0–0.85)
MONOCYTES NFR BLD AUTO: 9.1 % (ref 0–13.4)
NEUTROPHILS # BLD AUTO: 2.66 K/UL (ref 1.82–7.42)
NEUTROPHILS NFR BLD: 52.7 % (ref 44–72)
NITRITE UR QL STRIP.AUTO: NEGATIVE
NRBC # BLD AUTO: 0 K/UL
NRBC BLD-RTO: 0 /100 WBC (ref 0–0.2)
PH UR STRIP.AUTO: 5 [PH] (ref 5–8)
PLATELET # BLD AUTO: 294 K/UL (ref 164–446)
PMV BLD AUTO: 10.9 FL (ref 9–12.9)
PROT UR QL STRIP: NEGATIVE MG/DL
RBC # BLD AUTO: 4.95 M/UL (ref 4.2–5.4)
RBC # URNS HPF: ABNORMAL /HPF
RBC UR QL AUTO: NEGATIVE
RH BLD: NORMAL
SP GR UR STRIP.AUTO: 1.03
UROBILINOGEN UR STRIP.AUTO-MCNC: 0.2 MG/DL
WBC # BLD AUTO: 5.1 K/UL (ref 4.8–10.8)
WBC #/AREA URNS HPF: ABNORMAL /HPF

## 2024-04-01 PROCEDURE — 36415 COLL VENOUS BLD VENIPUNCTURE: CPT

## 2024-04-01 PROCEDURE — 86850 RBC ANTIBODY SCREEN: CPT

## 2024-04-01 PROCEDURE — 86901 BLOOD TYPING SEROLOGIC RH(D): CPT

## 2024-04-01 PROCEDURE — 99999 PR NO CHARGE: CPT | Performed by: OBSTETRICS & GYNECOLOGY

## 2024-04-01 PROCEDURE — 93005 ELECTROCARDIOGRAM TRACING: CPT

## 2024-04-01 PROCEDURE — 86900 BLOOD TYPING SEROLOGIC ABO: CPT

## 2024-04-01 PROCEDURE — 85025 COMPLETE CBC W/AUTO DIFF WBC: CPT

## 2024-04-01 PROCEDURE — 81001 URINALYSIS AUTO W/SCOPE: CPT

## 2024-04-01 ASSESSMENT — FIBROSIS 4 INDEX: FIB4 SCORE: 0.58
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
## 2024-04-02 LAB — EKG IMPRESSION: NORMAL

## 2024-04-02 PROCEDURE — 93010 ELECTROCARDIOGRAM REPORT: CPT | Performed by: INTERNAL MEDICINE

## 2024-04-15 ENCOUNTER — ANESTHESIA EVENT (OUTPATIENT)
Dept: SURGERY | Facility: MEDICAL CENTER | Age: 59
End: 2024-04-15
Payer: COMMERCIAL

## 2024-04-15 ENCOUNTER — ANESTHESIA (OUTPATIENT)
Dept: SURGERY | Facility: MEDICAL CENTER | Age: 59
End: 2024-04-15
Payer: COMMERCIAL

## 2024-04-15 ENCOUNTER — HOSPITAL ENCOUNTER (OUTPATIENT)
Facility: MEDICAL CENTER | Age: 59
End: 2024-04-15
Attending: OBSTETRICS & GYNECOLOGY | Admitting: OBSTETRICS & GYNECOLOGY
Payer: COMMERCIAL

## 2024-04-15 VITALS
OXYGEN SATURATION: 95 % | SYSTOLIC BLOOD PRESSURE: 129 MMHG | RESPIRATION RATE: 17 BRPM | DIASTOLIC BLOOD PRESSURE: 60 MMHG | BODY MASS INDEX: 38.56 KG/M2 | TEMPERATURE: 98.1 F | HEART RATE: 60 BPM | HEIGHT: 68 IN | WEIGHT: 254.41 LBS

## 2024-04-15 PROBLEM — T83.39XA RETAINED INTRAUTERINE CONTRACEPTIVE DEVICE (IUD): Status: ACTIVE | Noted: 2024-04-15

## 2024-04-15 LAB
ABO + RH BLD: NORMAL
PATHOLOGY CONSULT NOTE: NORMAL

## 2024-04-15 PROCEDURE — 160035 HCHG PACU - 1ST 60 MINS PHASE I: Performed by: OBSTETRICS & GYNECOLOGY

## 2024-04-15 PROCEDURE — 160038 HCHG SURGERY MINUTES - EA ADDL 1 MIN LEVEL 2: Performed by: OBSTETRICS & GYNECOLOGY

## 2024-04-15 PROCEDURE — 700101 HCHG RX REV CODE 250: Performed by: ANESTHESIOLOGY

## 2024-04-15 PROCEDURE — 160046 HCHG PACU - 1ST 60 MINS PHASE II: Performed by: OBSTETRICS & GYNECOLOGY

## 2024-04-15 PROCEDURE — 36415 COLL VENOUS BLD VENIPUNCTURE: CPT

## 2024-04-15 PROCEDURE — 160027 HCHG SURGERY MINUTES - 1ST 30 MINS LEVEL 2: Performed by: OBSTETRICS & GYNECOLOGY

## 2024-04-15 PROCEDURE — 160009 HCHG ANES TIME/MIN: Performed by: OBSTETRICS & GYNECOLOGY

## 2024-04-15 PROCEDURE — 58555 HYSTEROSCOPY DX SEP PROC: CPT | Performed by: OBSTETRICS & GYNECOLOGY

## 2024-04-15 PROCEDURE — 160025 RECOVERY II MINUTES (STATS): Performed by: OBSTETRICS & GYNECOLOGY

## 2024-04-15 PROCEDURE — 88300 SURGICAL PATH GROSS: CPT

## 2024-04-15 PROCEDURE — 700101 HCHG RX REV CODE 250: Performed by: OBSTETRICS & GYNECOLOGY

## 2024-04-15 PROCEDURE — 160048 HCHG OR STATISTICAL LEVEL 1-5: Performed by: OBSTETRICS & GYNECOLOGY

## 2024-04-15 PROCEDURE — 700105 HCHG RX REV CODE 258: Performed by: OBSTETRICS & GYNECOLOGY

## 2024-04-15 PROCEDURE — 160002 HCHG RECOVERY MINUTES (STAT): Performed by: OBSTETRICS & GYNECOLOGY

## 2024-04-15 PROCEDURE — 700111 HCHG RX REV CODE 636 W/ 250 OVERRIDE (IP): Performed by: ANESTHESIOLOGY

## 2024-04-15 RX ORDER — KETOROLAC TROMETHAMINE 15 MG/ML
INJECTION, SOLUTION INTRAMUSCULAR; INTRAVENOUS PRN
Status: DISCONTINUED | OUTPATIENT
Start: 2024-04-15 | End: 2024-04-15 | Stop reason: SURG

## 2024-04-15 RX ORDER — OXYTOCIN 10 [USP'U]/ML
INJECTION, SOLUTION INTRAMUSCULAR; INTRAVENOUS
Status: COMPLETED
Start: 2024-04-15 | End: 2024-04-15

## 2024-04-15 RX ORDER — LIDOCAINE HYDROCHLORIDE 10 MG/ML
INJECTION, SOLUTION EPIDURAL; INFILTRATION; INTRACAUDAL; PERINEURAL
Status: COMPLETED
Start: 2024-04-15 | End: 2024-04-15

## 2024-04-15 RX ORDER — DEXAMETHASONE SODIUM PHOSPHATE 4 MG/ML
INJECTION, SOLUTION INTRA-ARTICULAR; INTRALESIONAL; INTRAMUSCULAR; INTRAVENOUS; SOFT TISSUE PRN
Status: DISCONTINUED | OUTPATIENT
Start: 2024-04-15 | End: 2024-04-15 | Stop reason: SURG

## 2024-04-15 RX ORDER — LIDOCAINE HYDROCHLORIDE 20 MG/ML
INJECTION, SOLUTION EPIDURAL; INFILTRATION; INTRACAUDAL; PERINEURAL PRN
Status: DISCONTINUED | OUTPATIENT
Start: 2024-04-15 | End: 2024-04-15 | Stop reason: SURG

## 2024-04-15 RX ORDER — HYDROMORPHONE HYDROCHLORIDE 1 MG/ML
0.5 INJECTION, SOLUTION INTRAMUSCULAR; INTRAVENOUS; SUBCUTANEOUS
Status: DISCONTINUED | OUTPATIENT
Start: 2024-04-15 | End: 2024-04-15 | Stop reason: HOSPADM

## 2024-04-15 RX ORDER — METHYLERGONOVINE MALEATE 0.2 MG/ML
INJECTION INTRAVENOUS
Status: COMPLETED
Start: 2024-04-15 | End: 2024-04-15

## 2024-04-15 RX ORDER — ONDANSETRON 2 MG/ML
4 INJECTION INTRAMUSCULAR; INTRAVENOUS
Status: DISCONTINUED | OUTPATIENT
Start: 2024-04-15 | End: 2024-04-15 | Stop reason: HOSPADM

## 2024-04-15 RX ORDER — MISOPROSTOL 200 UG/1
TABLET ORAL
Status: COMPLETED
Start: 2024-04-15 | End: 2024-04-15

## 2024-04-15 RX ORDER — OXYCODONE HCL 5 MG/5 ML
5 SOLUTION, ORAL ORAL
Status: DISCONTINUED | OUTPATIENT
Start: 2024-04-15 | End: 2024-04-15 | Stop reason: HOSPADM

## 2024-04-15 RX ORDER — HYDROMORPHONE HYDROCHLORIDE 1 MG/ML
0.2 INJECTION, SOLUTION INTRAMUSCULAR; INTRAVENOUS; SUBCUTANEOUS
Status: DISCONTINUED | OUTPATIENT
Start: 2024-04-15 | End: 2024-04-15 | Stop reason: HOSPADM

## 2024-04-15 RX ORDER — SODIUM CHLORIDE, SODIUM LACTATE, POTASSIUM CHLORIDE, CALCIUM CHLORIDE 600; 310; 30; 20 MG/100ML; MG/100ML; MG/100ML; MG/100ML
INJECTION, SOLUTION INTRAVENOUS CONTINUOUS
Status: DISCONTINUED | OUTPATIENT
Start: 2024-04-15 | End: 2024-04-15 | Stop reason: HOSPADM

## 2024-04-15 RX ORDER — HYDROMORPHONE HYDROCHLORIDE 1 MG/ML
0.1 INJECTION, SOLUTION INTRAMUSCULAR; INTRAVENOUS; SUBCUTANEOUS
Status: DISCONTINUED | OUTPATIENT
Start: 2024-04-15 | End: 2024-04-15 | Stop reason: HOSPADM

## 2024-04-15 RX ORDER — OXYCODONE HCL 5 MG/5 ML
10 SOLUTION, ORAL ORAL
Status: DISCONTINUED | OUTPATIENT
Start: 2024-04-15 | End: 2024-04-15 | Stop reason: HOSPADM

## 2024-04-15 RX ORDER — DIPHENHYDRAMINE HYDROCHLORIDE 50 MG/ML
12.5 INJECTION INTRAMUSCULAR; INTRAVENOUS
Status: DISCONTINUED | OUTPATIENT
Start: 2024-04-15 | End: 2024-04-15 | Stop reason: HOSPADM

## 2024-04-15 RX ORDER — MEPERIDINE HYDROCHLORIDE 25 MG/ML
25 INJECTION INTRAMUSCULAR; INTRAVENOUS; SUBCUTANEOUS
Status: DISCONTINUED | OUTPATIENT
Start: 2024-04-15 | End: 2024-04-15 | Stop reason: HOSPADM

## 2024-04-15 RX ORDER — MIDAZOLAM HYDROCHLORIDE 1 MG/ML
INJECTION INTRAMUSCULAR; INTRAVENOUS PRN
Status: DISCONTINUED | OUTPATIENT
Start: 2024-04-15 | End: 2024-04-15 | Stop reason: SURG

## 2024-04-15 RX ORDER — IPRATROPIUM BROMIDE AND ALBUTEROL SULFATE 2.5; .5 MG/3ML; MG/3ML
3 SOLUTION RESPIRATORY (INHALATION)
Status: DISCONTINUED | OUTPATIENT
Start: 2024-04-15 | End: 2024-04-15 | Stop reason: HOSPADM

## 2024-04-15 RX ORDER — ONDANSETRON 2 MG/ML
INJECTION INTRAMUSCULAR; INTRAVENOUS PRN
Status: DISCONTINUED | OUTPATIENT
Start: 2024-04-15 | End: 2024-04-15 | Stop reason: SURG

## 2024-04-15 RX ORDER — MIDAZOLAM HYDROCHLORIDE 1 MG/ML
1 INJECTION INTRAMUSCULAR; INTRAVENOUS
Status: DISCONTINUED | OUTPATIENT
Start: 2024-04-15 | End: 2024-04-15 | Stop reason: HOSPADM

## 2024-04-15 RX ADMIN — FENTANYL CITRATE 100 MCG: 50 INJECTION, SOLUTION INTRAMUSCULAR; INTRAVENOUS at 13:10

## 2024-04-15 RX ADMIN — KETOROLAC TROMETHAMINE 30 MG: 15 INJECTION, SOLUTION INTRAMUSCULAR; INTRAVENOUS at 13:23

## 2024-04-15 RX ADMIN — MIDAZOLAM HYDROCHLORIDE 4 MG: 1 INJECTION, SOLUTION INTRAMUSCULAR; INTRAVENOUS at 13:05

## 2024-04-15 RX ADMIN — SODIUM CHLORIDE, POTASSIUM CHLORIDE, SODIUM LACTATE AND CALCIUM CHLORIDE: 600; 310; 30; 20 INJECTION, SOLUTION INTRAVENOUS at 13:05

## 2024-04-15 RX ADMIN — LIDOCAINE HYDROCHLORIDE 40 MG: 20 INJECTION, SOLUTION EPIDURAL; INFILTRATION; INTRACAUDAL at 13:10

## 2024-04-15 RX ADMIN — SUGAMMADEX 200 MG: 100 INJECTION, SOLUTION INTRAVENOUS at 13:50

## 2024-04-15 RX ADMIN — ROCURONIUM BROMIDE 50 MG: 10 INJECTION, SOLUTION INTRAVENOUS at 13:10

## 2024-04-15 RX ADMIN — DEXAMETHASONE SODIUM PHOSPHATE 8 MG: 4 INJECTION INTRA-ARTICULAR; INTRALESIONAL; INTRAMUSCULAR; INTRAVENOUS; SOFT TISSUE at 13:23

## 2024-04-15 RX ADMIN — ONDANSETRON 4 MG: 2 INJECTION INTRAMUSCULAR; INTRAVENOUS at 13:23

## 2024-04-15 RX ADMIN — PROPOFOL 200 MG: 10 INJECTION, EMULSION INTRAVENOUS at 13:10

## 2024-04-15 ASSESSMENT — FIBROSIS 4 INDEX: FIB4 SCORE: 0.63

## 2024-04-15 ASSESSMENT — PAIN DESCRIPTION - PAIN TYPE: TYPE: SURGICAL PAIN

## 2024-04-15 ASSESSMENT — PAIN SCALES - GENERAL: PAIN_LEVEL: 1

## 2024-04-15 NOTE — OR NURSING
1355 - Pt to PACU from OR. Report from anesthesia and OR RN. On 4L O2 via mask. Respirations even and unlabored. VSS. Scant drainage to peripad.    1420 - Patient drinking water without complications. Daughter Julia updated over the phone, she is making her way to [facility].    1439 - Patient up to bathroom to get changed. Successful in voiding per patient.    1451 - Discharge instructions discussed with daughter Julia and patient. All questions answered. Verbalized understanding.    1453 - Pt escorted out of department by ambulation with all belongings. Discharged home to responsible adult. PIV removed with tip intact.

## 2024-04-15 NOTE — ANESTHESIA PROCEDURE NOTES
Airway    Date/Time: 4/15/2024 1:11 PM    Performed by: Ramsey Cloud M.D.  Authorized by: Ramsey Cloud M.D.    Location:  OR  Urgency:  Elective  Indications for Airway Management:  Anesthesia      Spontaneous Ventilation: absent    Sedation Level:  Deep  Preoxygenated: Yes    Patient Position:  Sniffing  Final Airway Type:  Endotracheal airway  Final Endotracheal Airway:  ETT  Cuffed: Yes    Technique Used for Successful ETT Placement:  Direct laryngoscopy    Insertion Site:  Oral  Blade Type:  Samy  Laryngoscope Blade/Videolaryngoscope Blade Size:  3  ETT Size (mm):  7.0  Measured from:  Teeth  ETT to Teeth (cm):  22  Placement Verified by: auscultation and capnometry    Cormack-Lehane Classification:  Grade I - full view of glottis  Number of Attempts at Approach:  1

## 2024-04-15 NOTE — ANESTHESIA POSTPROCEDURE EVALUATION
Patient: Chioma Mock    Procedure Summary       Date: 04/15/24 Room / Location: [facility] ROOM 21 / SURGERY SAME DAY [facility]    Anesthesia Start: 1305 Anesthesia Stop: 1400    Procedures:       HYSTEROSCOPY WITH INTRAUTERINE DEVICE REMOVAL (Uterus)      REMOVAL, INTRAUTERINE DEVICE (Uterus) Diagnosis: (RETAINED INTRAUTERINE DEVICE)    Surgeons: Rosales Freitas M.D. Responsible Provider: Ramsey Cloud M.D.    Anesthesia Type: general ASA Status: 3            Final Anesthesia Type: general  Last vitals  BP   Blood Pressure: 113/58    Temp   36.7 °C (98.1 °F)    Pulse   66   Resp   12    SpO2   96 %      Anesthesia Post Evaluation    Patient location during evaluation: PACU  Patient participation: complete - patient participated  Level of consciousness: awake and alert  Pain score: 1    Airway patency: patent  Anesthetic complications: no  Cardiovascular status: hemodynamically stable  Respiratory status: acceptable  Hydration status: euvolemic    PONV: none          No notable events documented.     Nurse Pain Score: 0 (NPRS)

## 2024-04-15 NOTE — OR SURGEON
Immediate Post OP Note    PreOp Diagnosis: Retained intrauterine device      PostOp Diagnosis: Same as above      Procedure(s):  HYSTEROSCOPY WITH INTRAUTERINE DEVICE REMOVAL - Wound Class: Clean Contaminated  REMOVAL, INTRAUTERINE DEVICE    Surgeon(s):  Rosales Freitas M.D.    Assistant: BELIA Linda    Anesthesiologist/Type of Anesthesia:  Anesthesiologist: Ramsey Cloud M.D./General    Surgical Staff:  Circulator: Sweetie Sutton R.N.  Scrub Person: Gloria Akins Assist: ASHA Beck    Specimens removed if any:  ID Type Source Tests Collected by Time Destination   A : RETAINED IUD Other Other GROSS ONLY REQUEST Rosales Freitas M.D. 4/15/2024  1:37 PM        Estimated Blood Loss: < 1 ml    Findings: Normal appearing cervix, normal appearing endocervical canal. IUD strings located in upper endocervix.     Complications: None        4/15/2024 1:42 PM Rosales Freitas M.D.

## 2024-04-15 NOTE — OP REPORT
Operative Report     Patient: Chioma Mock  MRN: [id number]     YOB: 1965   Age: 58 y.o.   Sex:female  Unit: [facility] INTRA-OP [facility]  Room/Bed: R OR POOL/NONE  Location: [facility]            DATE OF OPERATION: 4/15/2024     Pre-operative Diagnosis: Retained intrauterine device  Post-operative Diagnosis: same   Procedure: Hysteroscopy, removal of retained intrauterine device  Anesthesia: General   Antibiotics: none  Surgeon(s):Rosales Freitas MD  Assistant(s): BELIA Linda  Findings:   Normal appearing cervix, normal appearing endocervical canal. IUD strings located in upper endocervix.   Complications: none   Specimens: Gross IUD to path  EBL: Minimal   IVF: 600 ml  Hysteroscopic Fluid Deficit:  0 mL deficit of NS    Indications:   Patient is a 58 y.o.  with a retained intrauterine device for the past 10 years with lost IUD strings. Pelvic ultrasound showed the device was in the uterus with no signs of imbedding or perforation. We were unable to remove it in the office.  Success rate, risks, and benefits of the procedure were discussed with patient at length.  The patient is aware of risk of infection, bleeding, fluid overload, blood clot in the leg or lungs, perforation of the uterus, anesthesia complications, and possible conversion to laparotomy.  All patient's questions were thoroughly answered prior to the procedure and she agreed to the procedure.    Procedure:   The patient was taken to the operating room and placed under general anesthesia. She was then prepped and draped in the normal sterile fashion in the dorsal lithotomy position. The bladder was drained via straight cath, about 60 ml of clear yellow urine. A medium Graves' speculum was placed in the vaginal vault and cervix was tilted posteriorly. The posterior lip of the cervix was grasped with a single tooth tenaculum and pulled into view. The cervix was dilated to a Hegar of 8. Next, the  hysteroscope was placed into the cervical os and in upper endocervix the IUD strings and tip of intrauterine device was visualized. A hysteroscopic forceps was introduced through the hysteroscope and strings grasped with ring forceps and IUD with hysteroscope was removed with gentle traction.   IUD was sent for gross pathology specimen.   Tenaculum removed from posterior lip of cervix and sites hemostatic with silver nitrate. Speculum removed.   All instruments were removed from the uterus, cervix and vagina, and hemostasis of the cervix was observed. At the end of the procedure, total fluid deficit was 0 ml.  Total blood loss was < 1 ml.   Sponge and instrument counts were correct at the end of the procedure and the patient was taken to recovery in good condition and tolerated the procedure well.       Rosales Freitas M.D.    Obstetrics and Gynecology    4/15/2024 1:52 PM

## 2024-04-15 NOTE — ANESTHESIA PREPROCEDURE EVALUATION
Case: [id number] Date/Time: 04/15/24 1345    Procedures:       HYSTEROSCOPY WITH INTRAUTERINE DEVICE REMOVAL      REMOVAL, INTRAUTERINE DEVICE    Pre-op diagnosis: INTRAUTERINE CONTRACEPTIVE DEVICE THREADS LOST    Location: [facility] ROOM 21 / SURGERY SAME DAY [facility]    Surgeons: Rosales Freitas M.D.            Relevant Problems   No relevant active problems       Physical Exam    Airway   Mallampati: II  TM distance: >3 FB  Neck ROM: full       Cardiovascular - normal exam  Rhythm: regular  Rate: normal  (-) murmur     Dental - normal exam           Pulmonary - normal exam  Breath sounds clear to auscultation     Abdominal    Neurological - normal exam                   Anesthesia Plan    ASA 3       Plan - general       Airway plan will be ETT          Induction: intravenous    Postoperative Plan: Postoperative administration of opioids is intended.    Pertinent diagnostic labs and testing reviewed    Informed Consent:    Anesthetic plan and risks discussed with patient.    Use of blood products discussed with: patient whom consented to blood products.

## 2024-04-15 NOTE — DISCHARGE INSTRUCTIONS
What to Expect Post Anesthesia    Rest and take it easy for the first 24 hours.  A responsible adult is recommended to remain with you during that time.  It is normal to feel sleepy.  We encourage you to not do anything that requires balance, judgment or coordination.    FOR 24 HOURS DO NOT:  Drive, operate machinery or run household appliances.  Drink beer or alcoholic beverages.  Make important decisions or sign legal documents.    To avoid nausea, slowly advance diet as tolerated, avoiding spicy or greasy foods for the first day.  Add more substantial food to your diet according to your provider's instructions.  Babies can be fed formula or breast milk as soon as they are hungry.  INCREASE FLUIDS AND FIBER TO AVOID CONSTIPATION.    MILD FLU-LIKE SYMPTOMS ARE NORMAL.  YOU MAY EXPERIENCE GENERALIZED MUSCLE ACHES, THROAT IRRITATION, HEADACHE AND/OR SOME NAUSEA.    If any questions arise, call your provider.  If your provider is not available, please feel free to call the [facility] at [phone].    MEDICATIONS: Resume taking daily medication.  Take prescribed pain medication with food.  If no medication is prescribed, you may take non-aspirin pain medication if needed.  PAIN MEDICATION CAN BE VERY CONSTIPATING.  Take a stool softener or laxative such as senokot, pericolace, or milk of magnesia if needed.    Last pain medication given toradol (similar to ibuprofen) at 1:30pm next dose of ibuprofen can be taken at 7:30pm. Tylenol may be taken at any time.

## 2024-04-15 NOTE — ANESTHESIA TIME REPORT
Anesthesia Start and Stop Event Times       Date Time Event    4/15/2024 1231 Ready for Procedure     1305 Anesthesia Start     1400 Anesthesia Stop          Responsible Staff  04/15/24      Name Role Begin End    Ramsey Cloud M.D. Anesth 1305 1400          Overtime Reason:  no overtime (within assigned shift)    Comments:

## 2024-05-01 ENCOUNTER — APPOINTMENT (OUTPATIENT)
Dept: OBGYN | Facility: CLINIC | Age: 59
End: 2024-05-01
Payer: COMMERCIAL

## 2024-05-01 VITALS — BODY MASS INDEX: 38.8 KG/M2 | WEIGHT: 255.2 LBS | SYSTOLIC BLOOD PRESSURE: 113 MMHG | DIASTOLIC BLOOD PRESSURE: 76 MMHG

## 2024-05-01 DIAGNOSIS — Z48.89 ENCOUNTER FOR POSTOPERATIVE CARE: ICD-10-CM

## 2024-05-01 PROCEDURE — 3078F DIAST BP <80 MM HG: CPT | Performed by: OBSTETRICS & GYNECOLOGY

## 2024-05-01 PROCEDURE — 3074F SYST BP LT 130 MM HG: CPT | Performed by: OBSTETRICS & GYNECOLOGY

## 2024-05-01 PROCEDURE — 99024 POSTOP FOLLOW-UP VISIT: CPT | Performed by: OBSTETRICS & GYNECOLOGY

## 2024-05-01 ASSESSMENT — FIBROSIS 4 INDEX: FIB4 SCORE: 0.63

## 2024-05-01 NOTE — PROGRESS NOTES
Postoperative Follow Up Visit    Chioma Mock is a 58 y.o. female    Chief complaint    Chief Complaint   Patient presents with    Post-op     Hysteroscopy and IUD removal        S/p hysteroscopy, removal of intrauterine device on 4/15/24 for retained IUD presenting for postop check    COMPLAINTS:    Reports no problems since surgery. Denies VB, foul discharge, pelvic pain, problems with bowel/bladder.       OBJECTIVE:    /76 (BP Location: Left arm, Patient Position: Sitting, BP Cuff Size: Large adult)   Wt 255 lb 3.2 oz   BMI 38.80 kg/m²     General: No acute distress, well nourished, alert.      deferred.     A/P    1. Encounter for postoperative care        S/p hysteroscopy, removal of intrauterine device on 4/15/24 for retained IUD, doing well.     May return to full activities.     RTC in 6 months for her gyn annual exam.     Rosales Freitas M.D.    Obstetrics and Gynecology    5/1/20248:30 AM

## 2024-05-01 NOTE — PROGRESS NOTES
PT is here for post op   Confirmed pharmacy and phone number   Pt states she has no pain or concerns after her surgery.

## 2024-09-07 ENCOUNTER — HOSPITAL ENCOUNTER (OUTPATIENT)
Dept: LAB | Facility: MEDICAL CENTER | Age: 59
End: 2024-09-07
Attending: FAMILY MEDICINE
Payer: COMMERCIAL

## 2024-09-07 LAB
25(OH)D3 SERPL-MCNC: 27 NG/ML (ref 30–100)
ALBUMIN SERPL BCP-MCNC: 3.8 G/DL (ref 3.2–4.9)
ALBUMIN/GLOB SERPL: 1.3 G/DL
ALP SERPL-CCNC: 113 U/L (ref 30–99)
ALT SERPL-CCNC: 12 U/L (ref 2–50)
ANION GAP SERPL CALC-SCNC: 12 MMOL/L (ref 7–16)
AST SERPL-CCNC: 18 U/L (ref 12–45)
BASOPHILS # BLD AUTO: 1.5 % (ref 0–1.8)
BASOPHILS # BLD: 0.07 K/UL (ref 0–0.12)
BILIRUB SERPL-MCNC: 0.4 MG/DL (ref 0.1–1.5)
BUN SERPL-MCNC: 15 MG/DL (ref 8–22)
CALCIUM ALBUM COR SERPL-MCNC: 9.1 MG/DL (ref 8.5–10.5)
CALCIUM SERPL-MCNC: 8.9 MG/DL (ref 8.5–10.5)
CHLORIDE SERPL-SCNC: 101 MMOL/L (ref 96–112)
CHOLEST SERPL-MCNC: 142 MG/DL (ref 100–199)
CO2 SERPL-SCNC: 26 MMOL/L (ref 20–33)
CREAT SERPL-MCNC: 0.65 MG/DL (ref 0.5–1.4)
EOSINOPHIL # BLD AUTO: 0.08 K/UL (ref 0–0.51)
EOSINOPHIL NFR BLD: 1.7 % (ref 0–6.9)
ERYTHROCYTE [DISTWIDTH] IN BLOOD BY AUTOMATED COUNT: 40.2 FL (ref 35.9–50)
EST. AVERAGE GLUCOSE BLD GHB EST-MCNC: 105 MG/DL
FASTING STATUS PATIENT QL REPORTED: NORMAL
GFR SERPLBLD CREATININE-BSD FMLA CKD-EPI: 103 ML/MIN/1.73 M 2
GGT SERPL-CCNC: 6 U/L (ref 7–34)
GLOBULIN SER CALC-MCNC: 2.9 G/DL (ref 1.9–3.5)
GLUCOSE SERPL-MCNC: 89 MG/DL (ref 65–99)
HBA1C MFR BLD: 5.3 % (ref 4–5.6)
HCT VFR BLD AUTO: 40 % (ref 37–47)
HDLC SERPL-MCNC: 53 MG/DL
HGB BLD-MCNC: 13.4 G/DL (ref 12–16)
IMM GRANULOCYTES # BLD AUTO: 0.01 K/UL (ref 0–0.11)
IMM GRANULOCYTES NFR BLD AUTO: 0.2 % (ref 0–0.9)
IRON SERPL-MCNC: 106 UG/DL (ref 40–170)
LDH SERPL L TO P-CCNC: 208 U/L (ref 107–266)
LDLC SERPL CALC-MCNC: 75 MG/DL
LYMPHOCYTES # BLD AUTO: 1.78 K/UL (ref 1–4.8)
LYMPHOCYTES NFR BLD: 37.2 % (ref 22–41)
MCH RBC QN AUTO: 29.9 PG (ref 27–33)
MCHC RBC AUTO-ENTMCNC: 33.5 G/DL (ref 32.2–35.5)
MCV RBC AUTO: 89.3 FL (ref 81.4–97.8)
MONOCYTES # BLD AUTO: 0.44 K/UL (ref 0–0.85)
MONOCYTES NFR BLD AUTO: 9.2 % (ref 0–13.4)
NEUTROPHILS # BLD AUTO: 2.4 K/UL (ref 1.82–7.42)
NEUTROPHILS NFR BLD: 50.2 % (ref 44–72)
NRBC # BLD AUTO: 0 K/UL
NRBC BLD-RTO: 0 /100 WBC (ref 0–0.2)
PHOSPHATE SERPL-MCNC: 3.1 MG/DL (ref 2.5–4.5)
PLATELET # BLD AUTO: 270 K/UL (ref 164–446)
PMV BLD AUTO: 10.5 FL (ref 9–12.9)
POTASSIUM SERPL-SCNC: 4.1 MMOL/L (ref 3.6–5.5)
PROT SERPL-MCNC: 6.7 G/DL (ref 6–8.2)
RBC # BLD AUTO: 4.48 M/UL (ref 4.2–5.4)
SODIUM SERPL-SCNC: 139 MMOL/L (ref 135–145)
TRIGL SERPL-MCNC: 71 MG/DL (ref 0–149)
TSH SERPL DL<=0.005 MIU/L-ACNC: 1.48 UIU/ML (ref 0.38–5.33)
URATE SERPL-MCNC: 3.9 MG/DL (ref 1.9–8.2)
WBC # BLD AUTO: 4.8 K/UL (ref 4.8–10.8)

## 2024-09-07 PROCEDURE — 83615 LACTATE (LD) (LDH) ENZYME: CPT

## 2024-09-07 PROCEDURE — 36415 COLL VENOUS BLD VENIPUNCTURE: CPT

## 2024-09-07 PROCEDURE — 83036 HEMOGLOBIN GLYCOSYLATED A1C: CPT

## 2024-09-07 PROCEDURE — 80061 LIPID PANEL: CPT

## 2024-09-07 PROCEDURE — 80053 COMPREHEN METABOLIC PANEL: CPT

## 2024-09-07 PROCEDURE — 82306 VITAMIN D 25 HYDROXY: CPT

## 2024-09-07 PROCEDURE — 84550 ASSAY OF BLOOD/URIC ACID: CPT

## 2024-09-07 PROCEDURE — 85025 COMPLETE CBC W/AUTO DIFF WBC: CPT

## 2024-09-07 PROCEDURE — 82977 ASSAY OF GGT: CPT

## 2024-09-07 PROCEDURE — 84443 ASSAY THYROID STIM HORMONE: CPT

## 2024-09-07 PROCEDURE — 83540 ASSAY OF IRON: CPT

## 2024-09-07 PROCEDURE — 84100 ASSAY OF PHOSPHORUS: CPT

## 2025-08-09 ENCOUNTER — OFFICE VISIT (OUTPATIENT)
Dept: URGENT CARE | Facility: PHYSICIAN GROUP | Age: 60
End: 2025-08-09
Payer: COMMERCIAL

## 2025-08-09 VITALS
TEMPERATURE: 97.4 F | HEIGHT: 68 IN | RESPIRATION RATE: 16 BRPM | DIASTOLIC BLOOD PRESSURE: 78 MMHG | OXYGEN SATURATION: 95 % | WEIGHT: 260.7 LBS | BODY MASS INDEX: 39.51 KG/M2 | HEART RATE: 73 BPM | SYSTOLIC BLOOD PRESSURE: 120 MMHG

## 2025-08-09 DIAGNOSIS — J98.8 RTI (RESPIRATORY TRACT INFECTION): Primary | ICD-10-CM

## 2025-08-09 PROCEDURE — 3078F DIAST BP <80 MM HG: CPT | Performed by: FAMILY MEDICINE

## 2025-08-09 PROCEDURE — 3074F SYST BP LT 130 MM HG: CPT | Performed by: FAMILY MEDICINE

## 2025-08-09 PROCEDURE — 99213 OFFICE O/P EST LOW 20 MIN: CPT | Performed by: FAMILY MEDICINE

## 2025-08-09 RX ORDER — DOXYCYCLINE 100 MG/1
100 CAPSULE ORAL 2 TIMES DAILY
Qty: 14 CAPSULE | Refills: 0 | Status: SHIPPED | OUTPATIENT
Start: 2025-08-09

## 2025-08-09 RX ORDER — PREDNISONE 10 MG/1
30 TABLET ORAL EVERY MORNING
Qty: 15 TABLET | Refills: 0 | Status: SHIPPED | OUTPATIENT
Start: 2025-08-09 | End: 2025-08-14

## 2025-08-09 RX ORDER — ALBUTEROL SULFATE 90 UG/1
2 INHALANT RESPIRATORY (INHALATION) EVERY 6 HOURS PRN
Qty: 8.5 G | Refills: 0 | Status: SHIPPED | OUTPATIENT
Start: 2025-08-09

## 2025-08-09 ASSESSMENT — ENCOUNTER SYMPTOMS
SINUS PAIN: 1
COUGH: 1

## 2025-08-09 ASSESSMENT — FIBROSIS 4 INDEX: FIB4 SCORE: 1.096965511460288953

## (undated) DEVICE — PAD SANITARY 11IN MAXI IND WRAPPED  (12EA/PK 24PK/CA)

## (undated) DEVICE — SENSOR OXIMETER ADULT SPO2 RD SET (20EA/BX)

## (undated) DEVICE — SOLUTION SORBITOL 3000ML (4/CA)

## (undated) DEVICE — GOWN WARMING STANDARD FLEX - (30/CA)

## (undated) DEVICE — KIT  I.V. START (100EA/CA)

## (undated) DEVICE — GLOVE SZ 6.5 BIOGEL PI MICRO - PF LF (50PR/BX)

## (undated) DEVICE — MASK OXYGEN VNYL ADLT MED CONC WITH 7 FOOT TUBING  - (50EA/CA)

## (undated) DEVICE — GLOVE BIOGEL INDICATOR SZ 6.5 SURGICAL PF LTX - (50PR/BX 4BX/CA)

## (undated) DEVICE — WATER IRRIGATION STERILE 1000ML (12EA/CA)

## (undated) DEVICE — SET TUBING AQUILEX IN-FLOW MYOSURE (10EA/BX)

## (undated) DEVICE — SET LEADWIRE 5 LEAD BEDSIDE DISPOSABLE ECG (1SET OF 5/EA)

## (undated) DEVICE — Device

## (undated) DEVICE — GLOVE BIOGEL SZ 6.5 SURGICAL PF LTX (50PR/BX 4BX/CA)

## (undated) DEVICE — SLEEVE VASO CALF MED - (10PR/CA)

## (undated) DEVICE — TUBE E-T HI-LO CUFF 7.0MM (10EA/PK)

## (undated) DEVICE — CANISTER SUCTION RIGID RED 1500CC (40EA/CA)

## (undated) DEVICE — TUBING CLEARLINK DUO-VENT - C-FLO (48EA/CA)

## (undated) DEVICE — CANNULA O2 COMFORT SOFT EAR ADULT 7 FT TUBING (50/CA)

## (undated) DEVICE — SODIUM CHL IRRIGATION 0.9% 1000ML (12EA/CA)

## (undated) DEVICE — SET TUBING AQUILEX OUT-FLOW MYOSURE (10EA/BX)

## (undated) DEVICE — COVER LIGHT HANDLE FLEXIBLE - SOFT (2EA/PK 80PK/CA)

## (undated) DEVICE — CANISTER SUCTION 3000ML MECHANICAL FILTER AUTO SHUTOFF MEDI-VAC NONSTERILE LF DISP  (40EA/CA)

## (undated) DEVICE — TOWEL STOP TIMEOUT SAFETY FLAG (40EA/CA)

## (undated) DEVICE — TUBE CONNECTING SUCTION - CLEAR PLASTIC STERILE 72 IN (50EA/CA)

## (undated) DEVICE — LACTATED RINGERS INJ 1000 ML - (14EA/CA 60CA/PF)

## (undated) DEVICE — SUCTION INSTRUMENT YANKAUER BULBOUS TIP W/O VENT (50EA/CA)

## (undated) DEVICE — ELECTRODE DUAL RETURN W/ CORD - (50/PK)